# Patient Record
Sex: MALE | Race: WHITE | Employment: FULL TIME | URBAN - METROPOLITAN AREA
[De-identification: names, ages, dates, MRNs, and addresses within clinical notes are randomized per-mention and may not be internally consistent; named-entity substitution may affect disease eponyms.]

---

## 2020-07-29 ENCOUNTER — OFFICE VISIT (OUTPATIENT)
Dept: FAMILY MEDICINE CLINIC | Age: 50
End: 2020-07-29
Payer: COMMERCIAL

## 2020-07-29 VITALS
TEMPERATURE: 99.2 F | DIASTOLIC BLOOD PRESSURE: 78 MMHG | HEART RATE: 83 BPM | SYSTOLIC BLOOD PRESSURE: 124 MMHG | OXYGEN SATURATION: 95 % | BODY MASS INDEX: 29.41 KG/M2 | WEIGHT: 187.4 LBS | HEIGHT: 67 IN

## 2020-07-29 PROCEDURE — 99386 PREV VISIT NEW AGE 40-64: CPT | Performed by: NURSE PRACTITIONER

## 2020-07-29 RX ORDER — HYDROCHLOROTHIAZIDE 12.5 MG/1
12.5 CAPSULE, GELATIN COATED ORAL DAILY
COMMUNITY
End: 2020-07-29 | Stop reason: SDUPTHER

## 2020-07-29 RX ORDER — LISINOPRIL 40 MG/1
40 TABLET ORAL DAILY
Qty: 360 TABLET | Refills: 0 | Status: SHIPPED | OUTPATIENT
Start: 2020-07-29 | End: 2021-08-11 | Stop reason: SDUPTHER

## 2020-07-29 RX ORDER — HYDROCHLOROTHIAZIDE 12.5 MG/1
12.5 CAPSULE, GELATIN COATED ORAL DAILY
Qty: 360 CAPSULE | Refills: 0 | Status: SHIPPED | OUTPATIENT
Start: 2020-07-29 | End: 2021-08-11 | Stop reason: SDUPTHER

## 2020-07-29 RX ORDER — LISINOPRIL 40 MG/1
40 TABLET ORAL DAILY
COMMUNITY
End: 2020-07-29 | Stop reason: SDUPTHER

## 2020-07-29 ASSESSMENT — PATIENT HEALTH QUESTIONNAIRE - PHQ9
SUM OF ALL RESPONSES TO PHQ QUESTIONS 1-9: 0
SUM OF ALL RESPONSES TO PHQ9 QUESTIONS 1 & 2: 0
1. LITTLE INTEREST OR PLEASURE IN DOING THINGS: 0
2. FEELING DOWN, DEPRESSED OR HOPELESS: 0
SUM OF ALL RESPONSES TO PHQ QUESTIONS 1-9: 0

## 2020-07-29 NOTE — PROGRESS NOTES
 Smokeless tobacco: Never Used   Substance and Sexual Activity    Alcohol use: Not on file    Drug use: Not on file    Sexual activity: Not on file   Lifestyle    Physical activity     Days per week: Not on file     Minutes per session: Not on file    Stress: Not on file   Relationships    Social connections     Talks on phone: Not on file     Gets together: Not on file     Attends Congregation service: Not on file     Active member of club or organization: Not on file     Attends meetings of clubs or organizations: Not on file     Relationship status: Not on file    Intimate partner violence     Fear of current or ex partner: Not on file     Emotionally abused: Not on file     Physically abused: Not on file     Forced sexual activity: Not on file   Other Topics Concern    Not on file   Social History Narrative    Not on file       Self-testicular exams: Yes  Sexual activity: has sex with females   Last eye exam: 2019, normal  Exercise: no regular exercise, history of heavy lifting    Review of Systems:  A comprehensive review of systems was negative. Physical Exam:   Vitals:    07/29/20 1119   BP: 124/78   Site: Right Upper Arm   Position: Sitting   Cuff Size: Medium Adult   Pulse: 83   Temp: 99.2 °F (37.3 °C)   SpO2: 95%   Weight: 187 lb 6.4 oz (85 kg)   Height: 5' 7\" (1.702 m)     Body mass index is 29.35 kg/m². Constitutional: He is oriented to person, place, and time. He appears well-developed and well-nourished. No distress. HEENT:   Head: Normocephalic and atraumatic. Right Ear: Tympanic membrane, external ear and ear canal normal.   Left Ear: Tympanic membrane, external ear and ear canal normal.   Mouth/Throat: Oropharynx is clear and moist and mucous membranes are normal. No oropharyngeal exudate or posterior oropharyngeal erythema. He has no cervical adenopathy. Eyes: Conjunctivae and extraocular motions are normal. Pupils are equal, round, and reactive to light. Neck:  Supple.  No JVD present. Carotid bruit is not present. No mass and no thyromegaly present. Cardiovascular: Normal rate, regular rhythm, normal heart sounds and intact distal pulses. Exam reveals no gallop and no friction rub. No murmur heard. Pulmonary/Chest: Effort normal and breath sounds normal. No respiratory distress. He has no wheezes, rhonchi or rales. Abdominal: Soft, non-tender. Bowel sounds and aorta are normal. There is no organomegaly, mass or bruit. Genitourinary:  Not examined. Musculoskeletal: Normal range of motion, no synovitis. He exhibits no edema. Neurological: He is alert and oriented to person, place, and time. He has normal reflexes. No cranial nerve deficit. Coordination normal.   Skin: Skin is warm, dry and intact. No suspicious lesions are noted. Psychiatric: He has a normal mood and affect.  His speech is normal and behavior is normal. Judgment, cognition and memory are normal.     Preventive Care:  Health Maintenance   Topic Date Due    Potassium monitoring  1970    Creatinine monitoring  1970    HIV screen  12/22/1984    DTaP/Tdap/Td vaccine (1 - Tdap) 12/22/1988    Lipid screen  12/22/2009    Diabetes screen  12/22/2009    Shingles Vaccine (1 of 2) 12/22/2019    Colon cancer screen colonoscopy  12/22/2019    Flu vaccine (1) 09/01/2020    Hepatitis A vaccine  Aged Out    Hepatitis B vaccine  Aged Out    Hib vaccine  Aged Out    Meningococcal (ACWY) vaccine  Aged Out    Pneumococcal 0-64 years Vaccine  Aged [employer] plan of care for Bethanne Closs        7/29/2020           Preventive Measures Status       Recommendations for screening   Prostate Cancer Screen  No results found for: PSA   Test recommended and ordered    Colon Cancer Screen  Last colonoscopy: NA Discussed/advised   Diabetes Screen  No results found for: GLUCOSE Test recommended and ordered   Cholesterol Screen  No results found for: CHOL, TRIG, HDL, LDLCALC, LDLDIRECT Test kg)   SpO2 95%   BMI 29.35 kg/m²   Weight: 187 lb 6.4 oz (85 kg)     BP Readings from Last 3 Encounters:   07/29/20 124/78     Wt Readings from Last 3 Encounters:   07/29/20 187 lb 6.4 oz (85 kg)     BMI Readings from Last 3 Encounters:   07/29/20 29.35 kg/m²       Physical Exam    Assessment/Plan    1. Encounter for preventive health examination  Advised influenza vaccination in fall  Suggested TDAP  Discussed colonoscopy  - CBC Auto Differential; Future  - Comprehensive Metabolic Panel, Fasting; Future  - Lipid, Fasting; Future    2. Hypotestosteronemia in male  Testosterone level  - Testosterone Cypionate 200 MG/ML KIT; Inject 1 mL into the muscle every 14 days for 2 doses. Dispense: 2 kit; Refill: 0    3. Screening for prostate cancer  - Psa screening; Future    4. Essential hypertension  Advised routine monitoring  - TSH with Reflex; Future  - lisinopril (PRINIVIL;ZESTRIL) 40 MG tablet; Take 1 tablet by mouth daily  Dispense: 360 tablet; Refill: 0  - hydroCHLOROthiazide (MICROZIDE) 12.5 MG capsule; Take 1 capsule by mouth daily  Dispense: 360 capsule; Refill: 0      Return in about 1 year (around 7/29/2021) for hypertension re-check, annual check up. This dictation was generated by voice recognition computer software. Although all attempts are made to edit the dictation for accuracy, there may be errors in the transcription that are not intended.

## 2020-07-30 ENCOUNTER — TELEPHONE (OUTPATIENT)
Dept: FAMILY MEDICINE CLINIC | Age: 50
End: 2020-07-30

## 2020-07-30 NOTE — TELEPHONE ENCOUNTER
Called and spoke with pharmacy. They stated that they don't have the kit available where they vial and syringe come together but are able to split the two up. Spoke with Chuy Doran okay to give verbal order to pharmacy. Patient will need 22g 1 1/2 inch needle with either 1ml/3ml syringe. Pharmacy aware and will get it ready for patient.

## 2021-01-04 DIAGNOSIS — E29.1 HYPOTESTOSTERONEMIA IN MALE: Primary | ICD-10-CM

## 2021-01-04 NOTE — TELEPHONE ENCOUNTER
Medication:   Requested Prescriptions     Pending Prescriptions Disp Refills    testosterone cypionate (DEPOTESTOTERONE CYPIONATE) 200 MG/ML injection [Pharmacy Med Name: testosterone cypionate 200 mg/mL intramuscular oil] 1 mL 0     Sig: INJECT ONE ML EVERY 14 DAYS        Patient Phone Number: [phone] (home)     Last appt: 7/29/2020   Next appt: Visit date not found    Last OARRS: No flowsheet data found. PDMP Monitoring:    Last PDMP Javi as Reviewed [facility]):  Review User Review Instant Review Result          Preferred Pharmacy:   [facility], [address] [place] [address] [phone]  [address] [address]  Phone: [phone] Fax: [phone]

## 2021-01-05 RX ORDER — TESTOSTERONE CYPIONATE 200 MG/ML
INJECTION INTRAMUSCULAR
Qty: 1 ML | Refills: 0 | Status: SHIPPED | OUTPATIENT
Start: 2021-01-05 | End: 2021-08-11

## 2021-05-05 ENCOUNTER — TELEPHONE (OUTPATIENT)
Dept: FAMILY MEDICINE CLINIC | Age: 51
End: 2021-05-05

## 2021-05-10 ENCOUNTER — OFFICE VISIT (OUTPATIENT)
Dept: FAMILY MEDICINE CLINIC | Age: 51
End: 2021-05-10
Payer: COMMERCIAL

## 2021-05-10 VITALS
DIASTOLIC BLOOD PRESSURE: 78 MMHG | SYSTOLIC BLOOD PRESSURE: 134 MMHG | HEIGHT: 67 IN | BODY MASS INDEX: 28.88 KG/M2 | WEIGHT: 184 LBS | TEMPERATURE: 98.1 F | HEART RATE: 91 BPM

## 2021-05-10 DIAGNOSIS — I10 ESSENTIAL HYPERTENSION: ICD-10-CM

## 2021-05-10 DIAGNOSIS — Z00.00 ENCOUNTER FOR PREVENTIVE HEALTH EXAMINATION: Primary | ICD-10-CM

## 2021-05-10 DIAGNOSIS — R73.9 HYPERGLYCEMIA: ICD-10-CM

## 2021-05-10 DIAGNOSIS — Z72.51 UNPROTECTED SEXUAL INTERCOURSE: Primary | ICD-10-CM

## 2021-05-10 DIAGNOSIS — Z12.5 SCREENING FOR PROSTATE CANCER: ICD-10-CM

## 2021-05-10 LAB
BILIRUBIN, POC: NEGATIVE
BLOOD URINE, POC: ABNORMAL
CLARITY, POC: CLEAR
COLOR, POC: YELLOW
GLUCOSE URINE, POC: NEGATIVE
KETONES, POC: NEGATIVE
LEUKOCYTE EST, POC: NEGATIVE
NITRITE, POC: NEGATIVE
PH, POC: 6
PROTEIN, POC: NEGATIVE
SPECIFIC GRAVITY, POC: 1.01
UROBILINOGEN, POC: ABNORMAL

## 2021-05-10 PROCEDURE — 81002 URINALYSIS NONAUTO W/O SCOPE: CPT | Performed by: NURSE PRACTITIONER

## 2021-05-10 PROCEDURE — 99213 OFFICE O/P EST LOW 20 MIN: CPT | Performed by: NURSE PRACTITIONER

## 2021-05-10 RX ORDER — VALACYCLOVIR HYDROCHLORIDE 500 MG/1
500 TABLET, FILM COATED ORAL DAILY
COMMUNITY
End: 2021-05-16 | Stop reason: SDUPTHER

## 2021-05-10 RX ORDER — TESTOSTERONE CYPIONATE 200 MG/ML
200 INJECTION INTRAMUSCULAR SEE ADMIN INSTRUCTIONS
COMMUNITY
End: 2021-08-11 | Stop reason: SDUPTHER

## 2021-05-10 ASSESSMENT — ENCOUNTER SYMPTOMS: BACK PAIN: 0

## 2021-05-10 NOTE — PROGRESS NOTES
Fiorella Jimenez  : 1970  Encounter date: 5/10/2021    This janeen 45 y.o. male who presents with  Chief Complaint   Patient presents with    Other     STD screen       History of present illness:    HPI Pt is 45year old male for STD check, reports recent unprotected sex in past.  Reports history of HSV and currently taking valtrex 500 mg daily. Last outbreak 1.5 years ago. Pt with girlfriend, history HPV, currently having genital warts removed. Pt requesting full STD panel. Pt denies current active symptoms. Reports one episode of unprotected sex, neither partner with active lesion. Current Outpatient Medications on File Prior to Visit   Medication Sig Dispense Refill    valACYclovir (VALTREX) 500 MG tablet Take 500 mg by mouth daily      testosterone cypionate (DEPOTESTOTERONE CYPIONATE) 200 MG/ML injection Inject 200 mg into the muscle See Admin Instructions. Inject 1 ml every 14 days      lisinopril (PRINIVIL;ZESTRIL) 40 MG tablet Take 1 tablet by mouth daily 360 tablet 0    hydroCHLOROthiazide (MICROZIDE) 12.5 MG capsule Take 1 capsule by mouth daily 360 capsule 0    testosterone cypionate (DEPOTESTOTERONE CYPIONATE) 200 MG/ML injection INJECT ONE ML EVERY 14 DAYS 1 mL 0     No current facility-administered medications on file prior to visit. No Known Allergies  History reviewed. No pertinent past medical history. History reviewed. No pertinent surgical history. History reviewed. No pertinent family history. Social History     Tobacco Use    Smoking status: Never Smoker    Smokeless tobacco: Never Used   Substance Use Topics    Alcohol use: Not on file        Review of Systems   Constitutional: Negative for activity change, chills, fatigue and fever. Genitourinary: Negative for decreased urine volume, difficulty urinating, discharge, dysuria, frequency, genital sores, hematuria, penile pain, penile swelling, scrotal swelling, testicular pain and urgency.    Musculoskeletal: Negative for back pain. Skin: Negative for rash. Allergic/Immunologic: Negative for immunocompromised state. Objective:    /78   Pulse 91   Temp 98.1 °F (36.7 °C)   Ht 5' 6.75\" (1.695 m) Comment: in shoes  Wt 184 lb (83.5 kg)   BMI 29.03 kg/m²   Weight: 184 lb (83.5 kg)     BP Readings from Last 3 Encounters:   05/10/21 134/78   07/29/20 124/78     Wt Readings from Last 3 Encounters:   05/10/21 184 lb (83.5 kg)   07/29/20 187 lb 6.4 oz (85 kg)     BMI Readings from Last 3 Encounters:   05/10/21 29.03 kg/m²   07/29/20 29.35 kg/m²       Physical Exam  Vitals signs reviewed. Constitutional:       Appearance: Normal appearance. He is well-developed. Cardiovascular:      Rate and Rhythm: Normal rate and regular rhythm. Heart sounds: Normal heart sounds. No murmur. Pulmonary:      Effort: Pulmonary effort is normal.      Breath sounds: Normal breath sounds. Genitourinary:     Penis: Normal.       Testes: Normal.   Skin:     General: Skin is warm and dry. Findings: No rash. Neurological:      Mental Status: He is alert and oriented to person, place, and time. Assessment/Plan    1. Unprotected sexual intercourse  Advised condoms  Discussed modes of transmission  Continue valtrex 500 mg daily prophylactic  - HIV Screen; Future  - HEPATITIS C ANTIBODY; Future  - C.trachomatis N.gonorrhoeae DNA, Urine; Future  - Herpes simplex virus (HSV) I/II antibodies IgG & IgM w/ reflex; Future  - RPR Reflex; Future  - POCT Urinalysis no Micro  - Culture, Urine      Return if symptoms worsen or fail to improve, for unresolved symptoms. This dictation was generated by voice recognition computer software. Although all attempts are made to edit the dictation for accuracy, there may be errors in the transcription that are not intended.

## 2021-05-12 LAB — URINE CULTURE, ROUTINE: NORMAL

## 2021-05-13 ENCOUNTER — TELEPHONE (OUTPATIENT)
Dept: FAMILY MEDICINE CLINIC | Age: 51
End: 2021-05-13

## 2021-05-13 NOTE — TELEPHONE ENCOUNTER
I called and informed the pt of his lab results. Pt voiced understanding. Pt also asked if he can get an Rx for the herpes? Pt dose not have a breakout at this time. He stats that he thought that there is a daily med that he can take for this.

## 2021-05-16 DIAGNOSIS — A60.00 GENITAL HERPES SIMPLEX, UNSPECIFIED SITE: Primary | ICD-10-CM

## 2021-05-16 RX ORDER — VALACYCLOVIR HYDROCHLORIDE 500 MG/1
500 TABLET, FILM COATED ORAL DAILY
Qty: 90 TABLET | Refills: 1 | Status: SHIPPED | OUTPATIENT
Start: 2021-05-16 | End: 2021-08-11 | Stop reason: SDUPTHER

## 2021-08-11 ENCOUNTER — TELEPHONE (OUTPATIENT)
Dept: FAMILY MEDICINE CLINIC | Age: 51
End: 2021-08-11

## 2021-08-11 ENCOUNTER — OFFICE VISIT (OUTPATIENT)
Dept: FAMILY MEDICINE CLINIC | Age: 51
End: 2021-08-11
Payer: COMMERCIAL

## 2021-08-11 VITALS
BODY MASS INDEX: 28.56 KG/M2 | HEART RATE: 90 BPM | DIASTOLIC BLOOD PRESSURE: 88 MMHG | WEIGHT: 182 LBS | SYSTOLIC BLOOD PRESSURE: 128 MMHG | TEMPERATURE: 98.2 F | HEIGHT: 67 IN | OXYGEN SATURATION: 98 %

## 2021-08-11 DIAGNOSIS — Z12.5 SCREENING FOR MALIGNANT NEOPLASM OF PROSTATE: ICD-10-CM

## 2021-08-11 DIAGNOSIS — Z12.11 SCREENING FOR COLON CANCER: ICD-10-CM

## 2021-08-11 DIAGNOSIS — A60.00 GENITAL HERPES SIMPLEX, UNSPECIFIED SITE: ICD-10-CM

## 2021-08-11 DIAGNOSIS — I10 ESSENTIAL HYPERTENSION: ICD-10-CM

## 2021-08-11 DIAGNOSIS — Z00.00 PREVENTATIVE HEALTH CARE: Primary | ICD-10-CM

## 2021-08-11 DIAGNOSIS — E29.1 HYPOTESTOSTERONEMIA IN MALE: ICD-10-CM

## 2021-08-11 PROCEDURE — 99396 PREV VISIT EST AGE 40-64: CPT | Performed by: NURSE PRACTITIONER

## 2021-08-11 RX ORDER — TESTOSTERONE CYPIONATE 200 MG/ML
200 INJECTION INTRAMUSCULAR SEE ADMIN INSTRUCTIONS
Status: CANCELLED | OUTPATIENT
Start: 2021-08-11

## 2021-08-11 RX ORDER — TESTOSTERONE CYPIONATE 200 MG/ML
200 INJECTION INTRAMUSCULAR SEE ADMIN INSTRUCTIONS
Qty: 10 ML | Refills: 1 | Status: SHIPPED | OUTPATIENT
Start: 2021-08-11 | End: 2022-04-26 | Stop reason: SDUPTHER

## 2021-08-11 RX ORDER — LISINOPRIL 40 MG/1
40 TABLET ORAL DAILY
Qty: 10 TABLET | Refills: 0 | Status: SHIPPED | OUTPATIENT
Start: 2021-08-11 | End: 2022-08-22 | Stop reason: SDUPTHER

## 2021-08-11 RX ORDER — HYDROCHLOROTHIAZIDE 12.5 MG/1
12.5 CAPSULE, GELATIN COATED ORAL DAILY
Qty: 360 CAPSULE | Refills: 0 | Status: SHIPPED | OUTPATIENT
Start: 2021-08-11 | End: 2022-08-22 | Stop reason: SDUPTHER

## 2021-08-11 RX ORDER — LISINOPRIL 40 MG/1
40 TABLET ORAL DAILY
Qty: 360 TABLET | Refills: 0 | Status: SHIPPED | OUTPATIENT
Start: 2021-08-11 | End: 2021-08-11 | Stop reason: SDUPTHER

## 2021-08-11 RX ORDER — VALACYCLOVIR HYDROCHLORIDE 500 MG/1
500 TABLET, FILM COATED ORAL DAILY
Qty: 90 TABLET | Refills: 1 | Status: SHIPPED | OUTPATIENT
Start: 2021-08-11 | End: 2022-04-28

## 2021-08-11 SDOH — ECONOMIC STABILITY: FOOD INSECURITY: WITHIN THE PAST 12 MONTHS, THE FOOD YOU BOUGHT JUST DIDN'T LAST AND YOU DIDN'T HAVE MONEY TO GET MORE.: NEVER TRUE

## 2021-08-11 SDOH — ECONOMIC STABILITY: FOOD INSECURITY: WITHIN THE PAST 12 MONTHS, YOU WORRIED THAT YOUR FOOD WOULD RUN OUT BEFORE YOU GOT MONEY TO BUY MORE.: NEVER TRUE

## 2021-08-11 ASSESSMENT — PATIENT HEALTH QUESTIONNAIRE - PHQ9
2. FEELING DOWN, DEPRESSED OR HOPELESS: 0
SUM OF ALL RESPONSES TO PHQ QUESTIONS 1-9: 0
SUM OF ALL RESPONSES TO PHQ9 QUESTIONS 1 & 2: 0
SUM OF ALL RESPONSES TO PHQ QUESTIONS 1-9: 0
1. LITTLE INTEREST OR PLEASURE IN DOING THINGS: 0
SUM OF ALL RESPONSES TO PHQ QUESTIONS 1-9: 0

## 2021-08-11 ASSESSMENT — SOCIAL DETERMINANTS OF HEALTH (SDOH): HOW HARD IS IT FOR YOU TO PAY FOR THE VERY BASICS LIKE FOOD, HOUSING, MEDICAL CARE, AND HEATING?: NOT HARD AT ALL

## 2021-08-11 NOTE — TELEPHONE ENCOUNTER
I called pt Pharmacy ([facility]) and spoke with the pharmacist to give the verbal on pt 22g 1 1/2 inch needle with 3cc syringe quantity of 9.

## 2021-08-11 NOTE — PROGRESS NOTES
Matilda Cosby  : 1970  Encounter date: 2021    This janeen 45 y.o. male who presents with  Chief Complaint   Patient presents with    Annual Exam     Physical        History of present illness:    HPI   History and Physical      Matilda Cosby  YOB: 1970    Date of Service:  2021    Chief Complaint:   Matilda Cosby is a 45 y.o. male who  presents for physical examination. HPI: Pt is 45year old male for annual exam.  Due for labs. Existing hypertension, low testosterone and genital herpes. Reports BP well controlled. Wt Readings from Last 3 Encounters:   21 182 lb (82.6 kg)   05/10/21 184 lb (83.5 kg)   20 187 lb 6.4 oz (85 kg)     BP Readings from Last 3 Encounters:   21 128/88   05/10/21 134/78   20 124/78       There is no problem list on file for this patient. No Known Allergies  Outpatient Medications Marked as Taking for the 21 encounter (Office Visit) with SUDHIR Zazueta NP   Medication Sig Dispense Refill    hydroCHLOROthiazide (MICROZIDE) 12.5 MG capsule Take 1 capsule by mouth daily 360 capsule 0    valACYclovir (VALTREX) 500 MG tablet Take 1 tablet by mouth daily 90 tablet 1    lisinopril (PRINIVIL;ZESTRIL) 40 MG tablet Take 1 tablet by mouth daily for 10 days 10 tablet 0    testosterone cypionate (DEPOTESTOTERONE CYPIONATE) 200 MG/ML injection Inject 1 mL into the muscle See Admin Instructions for 92 days. Inject 1 ml every 14 days 10 mL 1       History reviewed. No pertinent past medical history. History reviewed. No pertinent surgical history. History reviewed. No pertinent family history.   Social History     Socioeconomic History    Marital status: Single     Spouse name: Not on file    Number of children: Not on file    Years of education: Not on file    Highest education level: Not on file   Occupational History    Not on file   Tobacco Use    Smoking status: Never Smoker    Smokeless tobacco: Never Used   Substance and Sexual Activity    Alcohol use: Not on file    Drug use: Not on file    Sexual activity: Not on file   Other Topics Concern    Not on file   Social History Narrative    Not on file     Social Determinants of Health     Financial Resource Strain: Low Risk     Difficulty of Paying Living Expenses: Not hard at all   Food Insecurity: No Food Insecurity    Worried About Running Out of Food in the Last Year: Never true    [address] in the Last Year: Never true   Transportation Needs:     Lack of Transportation (Medical):  Lack of Transportation (Non-Medical):    Physical Activity:     Days of Exercise per Week:     Minutes of Exercise per Session:    Stress:     Feeling of Stress :    Social Connections:     Frequency of Communication with Friends and Family:     Frequency of Social Gatherings with Friends and Family:     Attends [employer] Services:     Active Member of Clubs or Organizations:     Attends Club or Organization Meetings:     Marital Status:    Intimate Partner Violence:     Fear of Current or Ex-Partner:     Emotionally Abused:     Physically Abused:     Sexually Abused:        Self-testicular exams: Yes  Sexual activity: single partner, contraception - condoms   Last eye exam: 04/2021  Exercise: no regular activity    Review of Systems:  A comprehensive review of systems was negative except for what was noted in the HPI. Physical Exam:   Vitals:    08/11/21 1341 08/11/21 1433   BP: (!) 138/92 128/88   Site: Left Upper Arm    Position: Sitting    Cuff Size: Medium Adult    Pulse: 90    Temp: 98.2 °F (36.8 °C)    TempSrc: Temporal    SpO2: 98%    Weight: 182 lb (82.6 kg)    Height: 5' 6.5\" (1.689 m)      Body mass index is 28.94 kg/m². Constitutional: He is oriented to person, place, and time. He appears well-developed and well-nourished. No distress. HEENT:   Head: Normocephalic and atraumatic.    Right Ear: Tympanic membrane, external ear and ear canal normal.   Left Ear: Tympanic membrane, external ear and ear canal normal.   Mouth/Throat: Oropharynx is clear and moist and mucous membranes are normal. No oropharyngeal exudate or posterior oropharyngeal erythema. He has no cervical adenopathy. Eyes: Conjunctivae and extraocular motions are normal. Pupils are equal, round, and reactive to light. Neck:  Supple. No JVD present. Carotid bruit is not present. No mass and no thyromegaly present. Cardiovascular: Normal rate, regular rhythm, normal heart sounds and intact distal pulses. Exam reveals no gallop and no friction rub. No murmur heard. Pulmonary/Chest: Effort normal and breath sounds normal. No respiratory distress. He has no wheezes, rhonchi or rales. Abdominal: Soft, non-tender. Bowel sounds and aorta are normal. There is no organomegaly, mass or bruit. Genitourinary:  rectal not indicated by age criteria and lack of symptoms. Musculoskeletal: Normal range of motion, no synovitis. He exhibits no edema. Neurological: He is alert and oriented to person, place, and time. He has normal reflexes. No cranial nerve deficit. Coordination normal.   Skin: Skin is warm, dry and intact. No suspicious lesions are noted. Psychiatric: He has a normal mood and affect.  His speech is normal and behavior is normal. Judgment, cognition and memory are normal.     Preventive Care:  Health Maintenance   Topic Date Due    Hepatitis C screen  Never done    HIV screen  Never done    DTaP/Tdap/Td vaccine (1 - Tdap) Never done    Colon cancer screen colonoscopy  Never done    Shingles Vaccine (1 of 2) Never done    Flu vaccine (1) 09/01/2021    Lipid screen  08/11/2025    COVID-19 Vaccine  Completed    Hepatitis A vaccine  Aged Out    Hepatitis B vaccine  Aged Out    Hib vaccine  Aged Out    Meningococcal (ACWY) vaccine  Aged Out    Pneumococcal 0-64 years Vaccine  Aged [employer] plan of care for [employer]        8/11/2021 Preventive Measures Status       Recommendations for screening   Prostate Cancer Screen  Lab Results   Component Value Date    PSA 1.8 08/11/2020      Test recommended and ordered    Colon Cancer Screen  Last colonoscopy: NA Test recommended and ordered   Diabetes Screen  Glucose (mg/dL)   Date Value   08/11/2020 91    Test recommended and ordered   Cholesterol Screen  Lab Results   Component Value Date    CHOL 159 08/11/2020    TRIG 57 08/11/2020    HDL 46 08/11/2020    LDLCALC 102 (H) 08/11/2020    Test recommended and ordered   Aspirin for Cardiovascular Prevention   No Not indicated   Weight: Body mass index is 28.94 kg/m². 5' 6.5\" (1.689 m)182 lb (82.6 kg)  Your BMI is 25 or greater, which indicates that you are overweight   Living Will: No recommended    Recommended Immunizations    Immunization History   Administered Date(s) Administered    COVID-19, Pfizer, PF, 30mcg/0.3mL 03/17/2021, 04/07/2021    Influenza vaccine:  recommended every fall         Other Recommendations ·   Follow up in this office every 6 months for re-evaluation of your chronic medical issues             Assessment/Plan:  Karma Larson was seen today for annual exam.    Diagnoses and all orders for this visit:    Preventative health care  -     CBC Auto Differential; Future  -     Comprehensive Metabolic Panel, Fasting; Future  -     Lipid, Fasting; Future  -     Hepatitis C Antibody; Future  -     HIV Screen; Future    Essential hypertension  -     Lipid, Fasting; Future  -     Microalbumin / Creatinine Urine Ratio; Future  -     hydroCHLOROthiazide (MICROZIDE) 12.5 MG capsule; Take 1 capsule by mouth daily  -     Discontinue: lisinopril (PRINIVIL;ZESTRIL) 40 MG tablet; Take 1 tablet by mouth daily  -     lisinopril (PRINIVIL;ZESTRIL) 40 MG tablet; Take 1 tablet by mouth daily for 10 days    Screening for malignant neoplasm of prostate  -     PSA screening;  Future    Genital herpes simplex, unspecified site  -     valACYclovir (VALTREX) 500 MG tablet; Take 1 tablet by mouth daily    Hypotestosteronemia in male  -     Testosterone; Future  -     testosterone cypionate (DEPOTESTOTERONE CYPIONATE) 200 MG/ML injection; Inject 1 mL into the muscle See Admin Instructions for 92 days. Inject 1 ml every 14 days    Screening for colon cancer  -     [place] - Gualberto Clement MD, Gastroenterology, [facility]            No current outpatient medications on file prior to visit. No current facility-administered medications on file prior to visit. No Known Allergies  History reviewed. No pertinent past medical history. History reviewed. No pertinent surgical history. History reviewed. No pertinent family history. Social History     Tobacco Use    Smoking status: Never Smoker    Smokeless tobacco: Never Used   Substance Use Topics    Alcohol use: Not on file        Review of Systems    Objective:    /88   Pulse 90   Temp 98.2 °F (36.8 °C) (Temporal)   Ht 5' 6.5\" (1.689 m)   Wt 182 lb (82.6 kg)   SpO2 98%   BMI 28.94 kg/m²   Weight: 182 lb (82.6 kg)     BP Readings from Last 3 Encounters:   08/11/21 128/88   05/10/21 134/78   07/29/20 124/78     Wt Readings from Last 3 Encounters:   08/11/21 182 lb (82.6 kg)   05/10/21 184 lb (83.5 kg)   07/29/20 187 lb 6.4 oz (85 kg)     BMI Readings from Last 3 Encounters:   08/11/21 28.94 kg/m²   05/10/21 29.03 kg/m²   07/29/20 29.35 kg/m²       Physical Exam    Assessment/Plan    1. Preventative health care  Advised increased exercise  Routine dental and vision  Advised living will  - CBC Auto Differential; Future  - Comprehensive Metabolic Panel, Fasting; Future  - Lipid, Fasting; Future  - Hepatitis C Antibody; Future  - HIV Screen; Future    2. Essential hypertension  Continue lisinopril 40 mg   Continue microzide 12.5 mg  - Lipid, Fasting; Future  - Microalbumin / Creatinine Urine Ratio; Future    3. Screening for malignant neoplasm of prostate  - PSA screening;  Future      Return in about 6 months (around 2/11/2022) for hypertension re-check. This dictation was generated by voice recognition computer software. Although all attempts are made to edit the dictation for accuracy, there may be errors in the transcription that are not intended.

## 2021-08-21 DIAGNOSIS — I10 ESSENTIAL HYPERTENSION: ICD-10-CM

## 2021-08-24 RX ORDER — LISINOPRIL 40 MG/1
TABLET ORAL
Qty: 10 TABLET | OUTPATIENT
Start: 2021-08-24

## 2021-08-24 NOTE — TELEPHONE ENCOUNTER
Medication:   Requested Prescriptions     Pending Prescriptions Disp Refills    lisinopril (PRINIVIL;ZESTRIL) 40 MG tablet [Pharmacy Med Name: LISINOPRIL 40 MG TABLET] 10 tablet 0     Sig: TAKE ONE TABLET BY MOUTH DAILY FOR 10 DAYS      Last Filled:     Patient Phone Number: [phone] (home)     Last appt: 8/11/2021   Next appt: Visit date not found    Last OARRS: No flowsheet data found. PDMP Monitoring:    Last PDMP Darlyn Flores as Reviewed [facility]):  Review User Review Instant Review Result   Jose Hancock 8/11/2021  2:17 PM Reviewed PDMP [1]     Preferred Pharmacy:   [address] [place] [address] [phone]  [address] [place] [address]  Phone: [phone] Fax: [phone]    [facility] [address], [address] [facility] [address]  [address] [facility]  [address] [place] [address]  Phone: [phone] Fax: [phone]

## 2021-08-26 NOTE — TELEPHONE ENCOUNTER
Please notify patient that blood study showed no signs of anemia, normal kidney and liver function, good cholesterol, negative for gonorrhea, chlamydia and trich. Tested positive for herpes 1 and 2. No signs of diabetes, normal PSA level.   Negative for syphilis, HIV and hepatitis C. Xolair Pregnancy And Lactation Text: This medication is Pregnancy Category B and is considered safe during pregnancy. This medication is excreted in breast milk.

## 2021-10-05 DIAGNOSIS — E78.00 PURE HYPERCHOLESTEROLEMIA: Primary | ICD-10-CM

## 2021-12-06 ENCOUNTER — OFFICE VISIT (OUTPATIENT)
Dept: FAMILY MEDICINE CLINIC | Age: 51
End: 2021-12-06
Payer: COMMERCIAL

## 2021-12-06 VITALS — TEMPERATURE: 96.7 F | OXYGEN SATURATION: 96 % | HEART RATE: 81 BPM

## 2021-12-06 DIAGNOSIS — J20.9 ACUTE BRONCHITIS, UNSPECIFIED ORGANISM: Primary | ICD-10-CM

## 2021-12-06 PROCEDURE — 99213 OFFICE O/P EST LOW 20 MIN: CPT | Performed by: FAMILY MEDICINE

## 2021-12-06 RX ORDER — CEFDINIR 300 MG/1
300 CAPSULE ORAL 2 TIMES DAILY
Qty: 14 CAPSULE | Refills: 0 | Status: SHIPPED | OUTPATIENT
Start: 2021-12-06 | End: 2021-12-13

## 2021-12-06 ASSESSMENT — PATIENT HEALTH QUESTIONNAIRE - PHQ9
SUM OF ALL RESPONSES TO PHQ QUESTIONS 1-9: 0
SUM OF ALL RESPONSES TO PHQ QUESTIONS 1-9: 0
2. FEELING DOWN, DEPRESSED OR HOPELESS: 0
SUM OF ALL RESPONSES TO PHQ9 QUESTIONS 1 & 2: 0
SUM OF ALL RESPONSES TO PHQ QUESTIONS 1-9: 0
1. LITTLE INTEREST OR PLEASURE IN DOING THINGS: 0

## 2021-12-14 ENCOUNTER — TELEPHONE (OUTPATIENT)
Dept: FAMILY MEDICINE CLINIC | Age: 51
End: 2021-12-14

## 2021-12-14 DIAGNOSIS — J20.9 ACUTE BRONCHITIS, UNSPECIFIED ORGANISM: Primary | ICD-10-CM

## 2021-12-14 RX ORDER — GUAIFENESIN AND CODEINE PHOSPHATE 100; 10 MG/5ML; MG/5ML
10 SOLUTION ORAL 4 TIMES DAILY PRN
Qty: 200 ML | Refills: 0 | Status: SHIPPED | OUTPATIENT
Start: 2021-12-14 | End: 2021-12-21

## 2021-12-14 NOTE — TELEPHONE ENCOUNTER
----- Message from [facility] sent at 12/14/2021  4:57 PM EST -----  Subject: Message to Provider    QUESTIONS  Information for Provider? Urgent!! pt needs prescription filled for severe   cough.   ---------------------------------------------------------------------------  --------------  CALL BACK INFO  What is the best way for the office to contact you? OK to leave message on   voicemail  Preferred Call Back Phone Number? [phone]  ---------------------------------------------------------------------------  --------------  SCRIPT ANSWERS  Relationship to Patient?  Self

## 2021-12-14 NOTE — TELEPHONE ENCOUNTER
----- Message from Josh Lopez sent at 12/14/2021  2:53 PM EST -----  Subject: Message to Provider    QUESTIONS  Information for Provider? Pt was seen last week for bronchitis and is   doing better but still has a cough, pt is requesting something is called   in for this. Uses [employer].  ---------------------------------------------------------------------------  --------------  Geovanna TREJO  What is the best way for the office to contact you? OK to leave message on   voicemail  Preferred Call Back Phone Number? [phone]  ---------------------------------------------------------------------------  --------------  SCRIPT ANSWERS  Relationship to Patient?  Self

## 2021-12-15 ENCOUNTER — TELEPHONE (OUTPATIENT)
Dept: FAMILY MEDICINE CLINIC | Age: 51
End: 2021-12-15

## 2021-12-15 DIAGNOSIS — J20.9 ACUTE BRONCHITIS, UNSPECIFIED ORGANISM: Primary | ICD-10-CM

## 2021-12-15 RX ORDER — MONTELUKAST SODIUM 10 MG/1
10 TABLET ORAL DAILY
Qty: 30 TABLET | Refills: 3 | Status: SHIPPED | OUTPATIENT
Start: 2021-12-15 | End: 2022-08-31

## 2021-12-15 NOTE — TELEPHONE ENCOUNTER
----- Message from Quinonesgalen Naylor sent at 12/14/2021  6:22 PM EST -----  Subject: Message to Provider    QUESTIONS  Information for Provider? Patient states that the Cough medicine that was   prescribed to him states that this will make him drowsy, and cant take   this while he is at work. Can you prescribe another medication that wont   make him dowsy  ---------------------------------------------------------------------------  --------------  [address]  What is the best way for the office to contact you? OK to leave message on   voicemail  Preferred Call Back Phone Number? [phone]  ---------------------------------------------------------------------------  --------------  SCRIPT ANSWERS  Relationship to Patient?  Self

## 2021-12-15 NOTE — TELEPHONE ENCOUNTER
Pt states he's been taking mucinex for about 7-10 days and it's not significant. States cough is still not going away. Please advise.

## 2021-12-15 NOTE — TELEPHONE ENCOUNTER
----- Message from [facility] sent at 12/14/2021  4:57 PM EST -----  Subject: Message to Provider    QUESTIONS  Information for Provider? Urgent!! pt needs prescription filled for severe   cough.   ---------------------------------------------------------------------------  --------------  CALL BACK INFO  What is the best way for the office to contact you? OK to leave message on   voicemail  Preferred Call Back Phone Number? [phone]  ---------------------------------------------------------------------------  --------------  SCRIPT ANSWERS  Relationship to Patient?  Self

## 2021-12-15 NOTE — TELEPHONE ENCOUNTER
The prescription cough medications, tessalon perles, tussin can have that side effect. Advise OTC delsym or mucinex.

## 2022-04-12 LAB
A/G RATIO: 2 (ref 1.2–2.2)
ALBUMIN SERPL-MCNC: 4.3 G/DL (ref 3.8–4.9)
ALP BLD-CCNC: 65 IU/L (ref 44–121)
ALT SERPL-CCNC: 72 IU/L (ref 0–44)
AMBIGUOUS ABBREVIATION: NORMAL
AMBIGUOUS ABBREVIATION: NORMAL
AST SERPL-CCNC: 33 IU/L (ref 0–40)
BILIRUB SERPL-MCNC: 0.4 MG/DL (ref 0–1.2)
BUN / CREAT RATIO: 20 (ref 9–20)
BUN BLDV-MCNC: 26 MG/DL (ref 6–24)
CALCIUM SERPL-MCNC: 9.9 MG/DL (ref 8.7–10.2)
CHLORIDE BLD-SCNC: 102 MMOL/L (ref 96–106)
CHOLESTEROL, TOTAL: 196 MG/DL (ref 100–199)
CO2: 26 MMOL/L (ref 20–29)
COMMENT: ABNORMAL
CREAT SERPL-MCNC: 1.29 MG/DL (ref 0.76–1.27)
ESTIMATED GLOMERULAR FILTRATION RATE CREATININE EQUATION: 67 ML/MIN/1.73
GLOBULIN: 2.2 G/DL (ref 1.5–4.5)
GLUCOSE BLD-MCNC: 102 MG/DL (ref 65–99)
HDLC SERPL-MCNC: 55 MG/DL
LDL CHOLESTEROL CALCULATED: 126 MG/DL (ref 0–99)
POTASSIUM SERPL-SCNC: 4.3 MMOL/L (ref 3.5–5.2)
SODIUM BLD-SCNC: 141 MMOL/L (ref 134–144)
TOTAL PROTEIN: 6.5 G/DL (ref 6–8.5)
TRIGL SERPL-MCNC: 82 MG/DL (ref 0–149)
VLDLC SERPL CALC-MCNC: 15 MG/DL (ref 5–40)

## 2022-04-14 DIAGNOSIS — E29.1 HYPOTESTOSTERONEMIA IN MALE: Primary | ICD-10-CM

## 2022-04-21 DIAGNOSIS — E29.1 HYPOTESTOSTERONEMIA IN MALE: ICD-10-CM

## 2022-04-25 ENCOUNTER — TELEPHONE (OUTPATIENT)
Dept: FAMILY MEDICINE CLINIC | Age: 52
End: 2022-04-25

## 2022-04-25 NOTE — TELEPHONE ENCOUNTER
----- Message from [address] [facility] sent at 4/22/2022  4:56 PM EDT -----  Subject: Refill Request    QUESTIONS  Name of Medication? testosterone cypionate (DEPOTESTOTERONE CYPIONATE) 200   MG/ML injection  Patient-reported dosage and instructions? inject q 2 weeks  How many days do you have left? 0  Preferred Pharmacy? [facility] #[id number]  Pharmacy phone number (if available)? [phone]  Additional Information for Provider? pt unsure if it's 1 or 1.5mg His just   has his level checked 04/21/2022, pt would like you to look at results to   see if any changes are needs  ---------------------------------------------------------------------------  --------------  CALL BACK INFO  What is the best way for the office to contact you? OK to leave message on   voicemail  Preferred Call Back Phone Number? [phone]  ---------------------------------------------------------------------------  --------------  SCRIPT ANSWERS  Relationship to Patient?  Self

## 2022-04-26 DIAGNOSIS — E29.1 HYPOTESTOSTERONEMIA IN MALE: ICD-10-CM

## 2022-04-26 LAB — TESTOSTERONE TOTAL: 271 NG/DL (ref 220–1000)

## 2022-04-26 RX ORDER — TESTOSTERONE CYPIONATE 200 MG/ML
200 INJECTION INTRAMUSCULAR SEE ADMIN INSTRUCTIONS
Qty: 10 ML | Refills: 1 | Status: SHIPPED | OUTPATIENT
Start: 2022-04-26 | End: 2022-08-31 | Stop reason: SDUPTHER

## 2022-04-26 NOTE — TELEPHONE ENCOUNTER
testosterone cypionate (DEPOTESTOTERONE CYPIONATE) 200 MG/ML injection 10 mL 1 8/11/2021 11/11/2021    Sig - Route: Inject 1 mL into the muscle See Admin Instructions for 92 days.  Inject 1 ml every 14 days - IntraMUSCular      [facility] #[id number] [address]

## 2022-04-28 DIAGNOSIS — A60.00 GENITAL HERPES SIMPLEX, UNSPECIFIED SITE: ICD-10-CM

## 2022-04-28 RX ORDER — VALACYCLOVIR HYDROCHLORIDE 500 MG/1
TABLET, FILM COATED ORAL
Qty: 90 TABLET | Refills: 1 | Status: SHIPPED | OUTPATIENT
Start: 2022-04-28 | End: 2022-08-22 | Stop reason: SDUPTHER

## 2022-08-22 ENCOUNTER — TELEPHONE (OUTPATIENT)
Dept: FAMILY MEDICINE CLINIC | Age: 52
End: 2022-08-22

## 2022-08-22 DIAGNOSIS — I10 ESSENTIAL HYPERTENSION: ICD-10-CM

## 2022-08-22 DIAGNOSIS — A60.00 GENITAL HERPES SIMPLEX, UNSPECIFIED SITE: ICD-10-CM

## 2022-08-22 RX ORDER — VALACYCLOVIR HYDROCHLORIDE 500 MG/1
TABLET, FILM COATED ORAL
Qty: 90 TABLET | Refills: 1 | Status: SHIPPED | OUTPATIENT
Start: 2022-08-22 | End: 2022-08-31 | Stop reason: DRUGHIGH

## 2022-08-22 RX ORDER — HYDROCHLOROTHIAZIDE 12.5 MG/1
12.5 CAPSULE, GELATIN COATED ORAL DAILY
Qty: 360 CAPSULE | Refills: 0 | Status: SHIPPED | OUTPATIENT
Start: 2022-08-22 | End: 2022-08-31 | Stop reason: SDUPTHER

## 2022-08-22 RX ORDER — LISINOPRIL 40 MG/1
40 TABLET ORAL DAILY
Qty: 10 TABLET | Refills: 0 | Status: SHIPPED | OUTPATIENT
Start: 2022-08-22 | End: 2022-08-23 | Stop reason: SDUPTHER

## 2022-08-22 NOTE — TELEPHONE ENCOUNTER
valACYclovir (VALTREX) 500 MG tablet 90 tablet 1 4/28/2022     Sig: TAKE ONE TABLET BY MOUTH DAILY      lisinopril (PRINIVIL;ZESTRIL) 40 MG tablet 10 tablet 0 8/11/2021 8/21/2021    Sig - Route: Take 1 tablet by mouth daily for 10 days - Oral      hydroCHLOROthiazide (MICROZIDE) 12.5 MG capsule 360 capsule 0 8/11/2021 8/6/2022    Sig - Route:  Take 1 capsule by mouth daily - Oral      Pt has appointment on 8/31 @10:30     [facility] on [address] in chart    Please advise    [phone]

## 2022-08-23 ENCOUNTER — TELEPHONE (OUTPATIENT)
Dept: FAMILY MEDICINE CLINIC | Age: 52
End: 2022-08-23

## 2022-08-23 DIAGNOSIS — I10 ESSENTIAL HYPERTENSION: ICD-10-CM

## 2022-08-23 RX ORDER — LISINOPRIL 40 MG/1
40 TABLET ORAL DAILY
Qty: 10 TABLET | Refills: 0 | Status: SHIPPED | OUTPATIENT
Start: 2022-08-23 | End: 2022-08-31 | Stop reason: SDUPTHER

## 2022-08-23 NOTE — TELEPHONE ENCOUNTER
Pt called stated that when he picked up his medication (Lisinopril) it had only 10 tablets. He was worried that the 10 tablets wouldn't last until his appointment on 8/31 and wanted to know can he get 30 tablets just like the other two medications that he picked up.     Please advise    [phone]

## 2022-08-29 DIAGNOSIS — E78.00 PURE HYPERCHOLESTEROLEMIA: ICD-10-CM

## 2022-08-29 LAB
A/G RATIO: 2.4 (ref 1.1–2.2)
ALBUMIN SERPL-MCNC: 4.6 G/DL (ref 3.4–5)
ALP BLD-CCNC: 64 U/L (ref 40–129)
ALT SERPL-CCNC: 20 U/L (ref 10–40)
ANION GAP SERPL CALCULATED.3IONS-SCNC: 12 MMOL/L (ref 3–16)
AST SERPL-CCNC: 20 U/L (ref 15–37)
BILIRUB SERPL-MCNC: <0.2 MG/DL (ref 0–1)
BUN BLDV-MCNC: 21 MG/DL (ref 7–20)
CALCIUM SERPL-MCNC: 9.6 MG/DL (ref 8.3–10.6)
CHLORIDE BLD-SCNC: 100 MMOL/L (ref 99–110)
CHOLESTEROL, FASTING: 174 MG/DL (ref 0–199)
CO2: 28 MMOL/L (ref 21–32)
CREAT SERPL-MCNC: 1.2 MG/DL (ref 0.9–1.3)
GFR AFRICAN AMERICAN: >60
GFR NON-AFRICAN AMERICAN: >60
GLUCOSE FASTING: 89 MG/DL (ref 70–99)
HDLC SERPL-MCNC: 41 MG/DL (ref 40–60)
LDL CHOLESTEROL CALCULATED: 105 MG/DL
POTASSIUM SERPL-SCNC: 4 MMOL/L (ref 3.5–5.1)
SODIUM BLD-SCNC: 140 MMOL/L (ref 136–145)
TOTAL PROTEIN: 6.5 G/DL (ref 6.4–8.2)
TRIGLYCERIDE, FASTING: 140 MG/DL (ref 0–150)
VLDLC SERPL CALC-MCNC: 28 MG/DL

## 2022-08-31 ENCOUNTER — OFFICE VISIT (OUTPATIENT)
Dept: FAMILY MEDICINE CLINIC | Age: 52
End: 2022-08-31
Payer: COMMERCIAL

## 2022-08-31 VITALS
DIASTOLIC BLOOD PRESSURE: 86 MMHG | SYSTOLIC BLOOD PRESSURE: 136 MMHG | TEMPERATURE: 98 F | HEART RATE: 73 BPM | HEIGHT: 66 IN | BODY MASS INDEX: 31.18 KG/M2 | WEIGHT: 194 LBS

## 2022-08-31 DIAGNOSIS — A60.00 GENITAL HERPES SIMPLEX, UNSPECIFIED SITE: ICD-10-CM

## 2022-08-31 DIAGNOSIS — R79.89 LOW TESTOSTERONE IN MALE: ICD-10-CM

## 2022-08-31 DIAGNOSIS — E29.1 HYPOTESTOSTERONEMIA IN MALE: ICD-10-CM

## 2022-08-31 DIAGNOSIS — Z00.00 PREVENTATIVE HEALTH CARE: Primary | ICD-10-CM

## 2022-08-31 DIAGNOSIS — I10 ESSENTIAL HYPERTENSION: ICD-10-CM

## 2022-08-31 DIAGNOSIS — Z12.11 SCREEN FOR COLON CANCER: ICD-10-CM

## 2022-08-31 PROBLEM — B00.9 HSV (HERPES SIMPLEX VIRUS) INFECTION: Status: ACTIVE | Noted: 2022-08-31

## 2022-08-31 PROCEDURE — 99396 PREV VISIT EST AGE 40-64: CPT | Performed by: NURSE PRACTITIONER

## 2022-08-31 RX ORDER — VALACYCLOVIR HYDROCHLORIDE 500 MG/1
TABLET, FILM COATED ORAL
Qty: 90 TABLET | Refills: 3 | Status: SHIPPED | OUTPATIENT
Start: 2022-08-31

## 2022-08-31 RX ORDER — VALACYCLOVIR HYDROCHLORIDE 500 MG/1
TABLET, FILM COATED ORAL
Qty: 90 TABLET | Refills: 3 | Status: SHIPPED | OUTPATIENT
Start: 2022-08-31 | End: 2022-08-31 | Stop reason: SDUPTHER

## 2022-08-31 RX ORDER — HYDROCHLOROTHIAZIDE 12.5 MG/1
12.5 CAPSULE, GELATIN COATED ORAL DAILY
Qty: 90 CAPSULE | Refills: 3 | Status: SHIPPED | OUTPATIENT
Start: 2022-08-31 | End: 2022-11-01 | Stop reason: SDUPTHER

## 2022-08-31 RX ORDER — LISINOPRIL 40 MG/1
40 TABLET ORAL DAILY
Qty: 90 TABLET | Refills: 3 | Status: SHIPPED | OUTPATIENT
Start: 2022-08-31 | End: 2022-11-01 | Stop reason: SDUPTHER

## 2022-08-31 RX ORDER — TESTOSTERONE CYPIONATE 200 MG/ML
200 INJECTION INTRAMUSCULAR SEE ADMIN INSTRUCTIONS
Qty: 10 ML | Refills: 1 | Status: SHIPPED | OUTPATIENT
Start: 2022-08-31 | End: 2022-12-01

## 2022-08-31 ASSESSMENT — PATIENT HEALTH QUESTIONNAIRE - PHQ9
2. FEELING DOWN, DEPRESSED OR HOPELESS: 0
SUM OF ALL RESPONSES TO PHQ9 QUESTIONS 1 & 2: 0
SUM OF ALL RESPONSES TO PHQ QUESTIONS 1-9: 0
1. LITTLE INTEREST OR PLEASURE IN DOING THINGS: 0
SUM OF ALL RESPONSES TO PHQ QUESTIONS 1-9: 0

## 2022-08-31 NOTE — PROGRESS NOTES
Delia Perales  : 1970  Encounter date: 2022    This janeen 45 y.o. male who presents with  Chief Complaint   Patient presents with    Annual Exam     Discuss labs        History of present illness:    HPI History and Physical      Delia Perales  YOB: 1970    Date of Service:  2022    Chief Complaint:   Delia Perales is a 45 y.o. male who  presents for physical examination. HPI: PT is 45year old male for annual PE. Recent labs showed improved LDL- 105, all other labs NL. Pt with existing hypertension and HSV, well controlled. Due for HM. Pt is requesting to have get larger quantity of testosterone, payng more for every 2 weeks, would like larger vial- 10 mL for cost purposes. Wt Readings from Last 3 Encounters:   22 194 lb (88 kg)   21 182 lb (82.6 kg)   05/10/21 184 lb (83.5 kg)     BP Readings from Last 3 Encounters:   22 136/86   21 128/88   05/10/21 134/78       Patient Active Problem List   Diagnosis    Essential hypertension    Genital herpes simplex    Low testosterone in male       No Known Allergies  Outpatient Medications Marked as Taking for the 22 encounter (Office Visit) with SUDHIR Burt NP   Medication Sig Dispense Refill    lisinopril (PRINIVIL;ZESTRIL) 40 MG tablet Take 1 tablet by mouth daily 90 tablet 3    hydroCHLOROthiazide (MICROZIDE) 12.5 MG capsule Take 1 capsule by mouth daily 90 capsule 3    valACYclovir (VALTREX) 500 MG tablet TAKE ONE TABLET BY MOUTH DAILY 90 tablet 3    testosterone cypionate (DEPOTESTOTERONE CYPIONATE) 200 MG/ML injection Inject 1 mL into the muscle See Admin Instructions for 92 days. Inject 1 ml every 14 days 10 mL 1       History reviewed. No pertinent past medical history. History reviewed. No pertinent surgical history. History reviewed. No pertinent family history.   Social History     Socioeconomic History    Marital status: Single     Spouse name: Not on file    Number of children: Not on file    Years of education: Not on file    Highest education level: Not on file   Occupational History    Not on file   Tobacco Use    Smoking status: Never    Smokeless tobacco: Never   Substance and Sexual Activity    Alcohol use: Not on file    Drug use: Not on file    Sexual activity: Not on file   Other Topics Concern    Not on file   Social History Narrative    Not on file     Social Determinants of Health     Financial Resource Strain: Not on file   Food Insecurity: Not on file   Transportation Needs: Not on file   Physical Activity: Not on file   Stress: Not on file   Social Connections: Not on file   Intimate Partner Violence: Not on file   Housing Stability: Not on file       Self-testicular exams: Yes  Sexual activity: none   Last eye exam: 2022  Exercise: no reqular exercise    Review of Systems:  A comprehensive review of systems was negative except for what was noted in the HPI. Physical Exam:   Vitals:    08/31/22 1044   BP: 136/86   Pulse: 73   Temp: 98 °F (36.7 °C)   Weight: 194 lb (88 kg)   Height: 5' 6\" (1.676 m)     Body mass index is 31.31 kg/m². Constitutional: He is oriented to person, place, and time. He appears well-developed and well-nourished. No distress. HEENT:   Head: Normocephalic and atraumatic. Right Ear: Tympanic membrane, external ear and ear canal normal.   Left Ear: Tympanic membrane, external ear and ear canal normal.   Mouth/Throat: Oropharynx is clear and moist and mucous membranes are normal. No oropharyngeal exudate or posterior oropharyngeal erythema. He has no cervical adenopathy. Eyes: Conjunctivae and extraocular motions are normal. Pupils are equal, round, and reactive to light. Neck:  Supple. No JVD present. Carotid bruit is not present. No mass and no thyromegaly present. Cardiovascular: Normal rate, regular rhythm, normal heart sounds and intact distal pulses. Exam reveals no gallop and no friction rub.  No murmur heard.  Pulmonary/Chest: Effort normal and breath sounds normal. No respiratory distress. He has no wheezes, rhonchi or rales. Abdominal: Soft, non-tender. Bowel sounds and aorta are normal. There is no organomegaly, mass or bruit. Genitourinary:  rectal not indicated by age criteria and lack of symptoms. Musculoskeletal: Normal range of motion, no synovitis. He exhibits no edema. Neurological: He is alert and oriented to person, place, and time. He has normal reflexes. No cranial nerve deficit. Coordination normal.   Skin: Skin is warm, dry and intact. No suspicious lesions are noted. Psychiatric: He has a normal mood and affect.  His speech is normal and behavior is normal. Judgment, cognition and memory are normal.     Preventive Care:  Health Maintenance   Topic Date Due    DTaP/Tdap/Td vaccine (1 - Tdap) Never done    Colorectal Cancer Screen  Never done    Shingles vaccine (1 of 2) Never done    COVID-19 Vaccine (4 - Booster for Pfizer series) 04/17/2022    Flu vaccine (1) 09/01/2022    Depression Screen  12/06/2022    Lipids  08/29/2027    Hepatitis C screen  Completed    HIV screen  Completed    Hepatitis A vaccine  Aged Out    Hepatitis B vaccine  Aged Out    Hib vaccine  Aged Out    Meningococcal (ACWY) vaccine  Aged Out    Pneumococcal 0-64 years Vaccine  Aged [employer] plan of care for Jack Sal        8/31/2022           Preventive Measures Status       Recommendations for screening   Prostate Cancer Screen  Lab Results   Component Value Date    PSA 1.7 10/04/2021      Repeat yearly    Colon Cancer Screen  Last colonoscopy: NA Test recommended and ordered  Test recommended and referral provided   Diabetes Screen  Glucose (mg/dL)   Date Value   04/11/2022 102 (H)    Repeat yearly   Cholesterol Screen  Lab Results   Component Value Date    CHOL 196 04/11/2022    TRIG 82 04/11/2022    HDL 41 08/29/2022    LDLCALC 105 (H) 08/29/2022    Repeat yearly   Aspirin for Cardiovascular Prevention   No Not indicated   Weight: Body mass index is 31.31 kg/m². 5' 6\" (1.676 m)194 lb (88 kg)  Your BMI is 25 or greater, which indicates that you are overweight   Living Will: No recommended    Recommended Immunizations    Immunization History   Administered Date(s) Administered    COVID-19, PFIZER PURPLE top, DILUTE for use, (age 15 y+), 30mcg/0.3mL 03/17/2021, 04/07/2021, 12/17/2021    Influenza vaccine:  recommended every fall  Shingles vaccine:  discussed, due         Other Recommendations   Follow up in this office every 12 months for re-evaluation of your chronic medical issues             Assessment/Plan:  Janae Parr was seen today for annual exam.    Diagnoses and all orders for this visit:    Preventative health care    Essential hypertension  -     lisinopril (PRINIVIL;ZESTRIL) 40 MG tablet; Take 1 tablet by mouth daily  -     hydroCHLOROthiazide (MICROZIDE) 12.5 MG capsule; Take 1 capsule by mouth daily    Screen for colon cancer  -     AFL - Ryan Pham MD, Gastroenterology, [facility]    Genital herpes simplex, unspecified site  -     Discontinue: valACYclovir (VALTREX) 500 MG tablet; TAKE ONE TABLET BY MOUTH DAILY  -     valACYclovir (VALTREX) 500 MG tablet; TAKE ONE TABLET BY MOUTH DAILY    Low testosterone in male    Hypotestosteronemia in male  -     testosterone cypionate (DEPOTESTOTERONE CYPIONATE) 200 MG/ML injection; Inject 1 mL into the muscle See Admin Instructions for 92 days. Inject 1 ml every 14 days        No current outpatient medications on file prior to visit. No current facility-administered medications on file prior to visit. No Known Allergies  History reviewed. No pertinent past medical history. History reviewed. No pertinent surgical history. History reviewed. No pertinent family history.    Social History     Tobacco Use    Smoking status: Never    Smokeless tobacco: Never   Substance Use Topics    Alcohol use: Not on file        Review of Systems    Objective:    /86   Pulse 73   Temp 98 °F (36.7 °C)   Ht 5' 6\" (1.676 m)   Wt 194 lb (88 kg)   BMI 31.31 kg/m²   Weight: 194 lb (88 kg)     BP Readings from Last 3 Encounters:   08/31/22 136/86   08/11/21 128/88   05/10/21 134/78     Wt Readings from Last 3 Encounters:   08/31/22 194 lb (88 kg)   08/11/21 182 lb (82.6 kg)   05/10/21 184 lb (83.5 kg)     BMI Readings from Last 3 Encounters:   08/31/22 31.31 kg/m²   08/11/21 28.94 kg/m²   05/10/21 29.03 kg/m²       Physical Exam    Assessment/Plan    1. Preventative health care  Advised routine dental and vision  Increased exercise, healthy diet and weight loss  Advised living will    2. Essential hypertension  controlled  - lisinopril (PRINIVIL;ZESTRIL) 40 MG tablet; Take 1 tablet by mouth daily  Dispense: 90 tablet; Refill: 3  - hydroCHLOROthiazide (MICROZIDE) 12.5 MG capsule; Take 1 capsule by mouth daily  Dispense: 90 capsule; Refill: 3    3. Screen for colon cancer  - AFL - Mariano Almanzar MD, Gastroenterology, [facility]    4. Genital herpes simplex, unspecified site  Controlled  Advised safe sex practices  - valACYclovir (VALTREX) 500 MG tablet; TAKE ONE TABLET BY MOUTH DAILY  Dispense: 90 tablet; Refill: 3    5. Low testosterone in male  Testosterone 200 mg/mL  Controlled  Called pharmacy, insurance will only cover smaller dose vials, twice monthly    Return in about 1 year (around 8/31/2023) for annual check up, lab review, hypertension re-check. This dictation was generated by voice recognition computer software. Although all attempts are made to edit the dictation for accuracy, there may be errors in the transcription that are not intended.

## 2022-11-01 DIAGNOSIS — I10 ESSENTIAL HYPERTENSION: ICD-10-CM

## 2022-11-01 RX ORDER — HYDROCHLOROTHIAZIDE 12.5 MG/1
12.5 CAPSULE, GELATIN COATED ORAL DAILY
Qty: 90 CAPSULE | Refills: 3 | Status: SHIPPED | OUTPATIENT
Start: 2022-11-01 | End: 2023-10-27

## 2022-11-01 RX ORDER — LISINOPRIL 40 MG/1
40 TABLET ORAL DAILY
Qty: 90 TABLET | Refills: 3 | Status: SHIPPED | OUTPATIENT
Start: 2022-11-01 | End: 2023-01-30

## 2022-11-09 DIAGNOSIS — E29.1 HYPOTESTOSTERONEMIA IN MALE: ICD-10-CM

## 2022-11-10 RX ORDER — TESTOSTERONE CYPIONATE 200 MG/ML
INJECTION INTRAMUSCULAR
Qty: 10 ML | Refills: 0 | Status: SHIPPED | OUTPATIENT
Start: 2022-11-10 | End: 2022-11-24

## 2022-11-10 NOTE — TELEPHONE ENCOUNTER
Medication:   Requested Prescriptions     Pending Prescriptions Disp Refills    testosterone cypionate (DEPOTESTOTERONE CYPIONATE) 200 MG/ML injection [Pharmacy Med Name: TESTOSTERONE CYP 200MG/ML SDV 1ML] 10 mL      Sig: INJECT 1ML EVERY 14 DAYS      Last Filled:  8/31/2022    Patient Phone Number: [phone] (home)     Last appt: 8/31/2022   Next appt: Visit date not found    Last OARRS: No flowsheet data found. PDMP Monitoring:    Last PDMP Luis Alberto Jefferson as Reviewed [facility]):  Review User Review Instant Review Result   Josselin García 8/31/2022 11:29 AM Reviewed PDMP [1]     Preferred Pharmacy:   [address]  [address]  [place] [address]  Phone: [phone] Fax: [phone]    Zoe Bagley, [address] [facility] [phone]  [address] [place] [address]  Phone: [phone] Fax: [phone] [facility], [address] [place], [place] [id number] - f [phone]  [place] [address]  Phone: [phone] Fax: [phone]

## 2023-04-18 DIAGNOSIS — E29.1 HYPOTESTOSTERONEMIA IN MALE: ICD-10-CM

## 2023-04-19 RX ORDER — TESTOSTERONE CYPIONATE 200 MG/ML
INJECTION, SOLUTION INTRAMUSCULAR
Qty: 10 ML | Refills: 0 | Status: SHIPPED | OUTPATIENT
Start: 2023-04-19 | End: 2023-05-03

## 2023-04-19 NOTE — TELEPHONE ENCOUNTER
Medication:   Requested Prescriptions     Pending Prescriptions Disp Refills    testosterone cypionate (DEPOTESTOTERONE CYPIONATE) 200 MG/ML injection [Pharmacy Med Name: TESTOSTERONE CYP 200MG/ML MDV 10ML] 10 mL      Sig: INJECT 1 ML INTO THE MUSCLE EVERY 14 DAYS      Last Filled:  11/10/2022    Patient Phone Number: [phone] (home)     Last appt: 8/31/2022   Next appt: Visit date not found    Last OARRS: No flowsheet data found. PDMP Monitoring:    Last PDMP Yvette Nicely as Reviewed [facility]):  Review User Review Instant Review Result   Greg Almanza 8/31/2022 11:29 AM Reviewed PDMP [1]     Preferred Pharmacy:   [address]  [address]  [address] [place] [address]  Phone: [phone] Fax: [phone]    [place] [facility] [place] [phone]  [facility] [address] [address]  Phone: [phone] Fax: [phone] [facility], [address] [place] [address] [phone] - F [phone]  [facility], [facility] [address]  Phone: [phone] Fax: [phone]

## 2023-09-05 ENCOUNTER — TELEPHONE (OUTPATIENT)
Dept: FAMILY MEDICINE CLINIC | Age: 53
End: 2023-09-05

## 2023-09-05 NOTE — TELEPHONE ENCOUNTER
----- Message from Sandrine Stubbs sent at 9/5/2023  3:43 PM EDT -----  Subject: Referral Request    Reason for referral request? PT NEEDS BLOODWORK ORDERS FOR HIS PHYSICAL ON   9- PLEASE CALL PT WHEN ORDERS ARE IN SO HE CAN GET DRAWN  Provider patient wants to be referred to(if known):     Provider Phone Number(if known):     Additional Information for Provider?   ---------------------------------------------------------------------------  --------------  Bianca Marine Patrick    [phone]; OK to leave message on voicemail  ---------------------------------------------------------------------------  --------------

## 2023-09-06 DIAGNOSIS — Z13.1 SCREENING FOR DIABETES MELLITUS: ICD-10-CM

## 2023-09-06 DIAGNOSIS — I10 ESSENTIAL HYPERTENSION: Primary | ICD-10-CM

## 2023-09-06 DIAGNOSIS — Z12.5 SCREENING FOR PROSTATE CANCER: ICD-10-CM

## 2023-09-06 DIAGNOSIS — Z13.220 SCREENING FOR CHOLESTEROL LEVEL: ICD-10-CM

## 2023-09-06 DIAGNOSIS — Z00.00 PREVENTATIVE HEALTH CARE: ICD-10-CM

## 2023-09-07 DIAGNOSIS — Z13.1 SCREENING FOR DIABETES MELLITUS: ICD-10-CM

## 2023-09-07 DIAGNOSIS — Z12.5 SCREENING FOR PROSTATE CANCER: ICD-10-CM

## 2023-09-07 DIAGNOSIS — I10 ESSENTIAL HYPERTENSION: ICD-10-CM

## 2023-09-07 DIAGNOSIS — Z13.220 SCREENING FOR CHOLESTEROL LEVEL: ICD-10-CM

## 2023-09-07 LAB
ALBUMIN SERPL-MCNC: 4.7 G/DL (ref 3.4–5)
ALBUMIN/GLOB SERPL: 2.4 {RATIO} (ref 1.1–2.2)
ALP SERPL-CCNC: 65 U/L (ref 40–129)
ALT SERPL-CCNC: 64 U/L (ref 10–40)
ANION GAP SERPL CALCULATED.3IONS-SCNC: 13 MMOL/L (ref 3–16)
AST SERPL-CCNC: 34 U/L (ref 15–37)
BASOPHILS # BLD: 0 K/UL (ref 0–0.2)
BASOPHILS NFR BLD: 0.5 %
BILIRUB SERPL-MCNC: 0.3 MG/DL (ref 0–1)
BUN SERPL-MCNC: 28 MG/DL (ref 7–20)
CALCIUM SERPL-MCNC: 10 MG/DL (ref 8.3–10.6)
CHLORIDE SERPL-SCNC: 102 MMOL/L (ref 99–110)
CHOLEST SERPL-MCNC: 202 MG/DL (ref 0–199)
CO2 SERPL-SCNC: 27 MMOL/L (ref 21–32)
CREAT SERPL-MCNC: 1.2 MG/DL (ref 0.9–1.3)
DEPRECATED RDW RBC AUTO: 14.5 % (ref 12.4–15.4)
EOSINOPHIL # BLD: 0.1 K/UL (ref 0–0.6)
EOSINOPHIL NFR BLD: 2.1 %
GFR SERPLBLD CREATININE-BSD FMLA CKD-EPI: >60 ML/MIN/{1.73_M2}
GLUCOSE P FAST SERPL-MCNC: 95 MG/DL (ref 70–99)
HCT VFR BLD AUTO: 49 % (ref 40.5–52.5)
HDLC SERPL-MCNC: 51 MG/DL (ref 40–60)
HGB BLD-MCNC: 16.8 G/DL (ref 13.5–17.5)
LDL CHOLESTEROL CALCULATED: 134 MG/DL
LYMPHOCYTES # BLD: 2 K/UL (ref 1–5.1)
LYMPHOCYTES NFR BLD: 40.4 %
MCH RBC QN AUTO: 30.2 PG (ref 26–34)
MCHC RBC AUTO-ENTMCNC: 34.3 G/DL (ref 31–36)
MCV RBC AUTO: 88 FL (ref 80–100)
MONOCYTES # BLD: 0.6 K/UL (ref 0–1.3)
MONOCYTES NFR BLD: 11.5 %
NEUTROPHILS # BLD: 2.2 K/UL (ref 1.7–7.7)
NEUTROPHILS NFR BLD: 45.5 %
PLATELET # BLD AUTO: 228 K/UL (ref 135–450)
PMV BLD AUTO: 7.8 FL (ref 5–10.5)
POTASSIUM SERPL-SCNC: 4.5 MMOL/L (ref 3.5–5.1)
PROT SERPL-MCNC: 6.7 G/DL (ref 6.4–8.2)
PSA SERPL DL<=0.01 NG/ML-MCNC: 1.81 NG/ML (ref 0–4)
RBC # BLD AUTO: 5.57 M/UL (ref 4.2–5.9)
SODIUM SERPL-SCNC: 142 MMOL/L (ref 136–145)
TRIGL SERPL-MCNC: 84 MG/DL (ref 0–150)
VLDLC SERPL CALC-MCNC: 17 MG/DL
WBC # BLD AUTO: 4.9 K/UL (ref 4–11)

## 2023-09-08 LAB
EST. AVERAGE GLUCOSE BLD GHB EST-MCNC: 119.8 MG/DL
HBA1C MFR BLD: 5.8 %

## 2023-09-20 ENCOUNTER — OFFICE VISIT (OUTPATIENT)
Dept: FAMILY MEDICINE CLINIC | Age: 53
End: 2023-09-20
Payer: COMMERCIAL

## 2023-09-20 VITALS
HEART RATE: 77 BPM | WEIGHT: 192 LBS | DIASTOLIC BLOOD PRESSURE: 84 MMHG | TEMPERATURE: 98.1 F | HEIGHT: 66 IN | BODY MASS INDEX: 30.86 KG/M2 | OXYGEN SATURATION: 94 % | SYSTOLIC BLOOD PRESSURE: 128 MMHG

## 2023-09-20 DIAGNOSIS — A60.00 GENITAL HERPES SIMPLEX, UNSPECIFIED SITE: ICD-10-CM

## 2023-09-20 DIAGNOSIS — Z00.00 PREVENTATIVE HEALTH CARE: Primary | ICD-10-CM

## 2023-09-20 DIAGNOSIS — M77.8 TENDONITIS OF ELBOW, LEFT: ICD-10-CM

## 2023-09-20 DIAGNOSIS — E29.1 HYPOTESTOSTERONEMIA IN MALE: ICD-10-CM

## 2023-09-20 DIAGNOSIS — Z23 NEED FOR VACCINATION: ICD-10-CM

## 2023-09-20 DIAGNOSIS — I10 ESSENTIAL HYPERTENSION: ICD-10-CM

## 2023-09-20 PROCEDURE — 99396 PREV VISIT EST AGE 40-64: CPT | Performed by: NURSE PRACTITIONER

## 2023-09-20 PROCEDURE — 3074F SYST BP LT 130 MM HG: CPT | Performed by: NURSE PRACTITIONER

## 2023-09-20 PROCEDURE — 3079F DIAST BP 80-89 MM HG: CPT | Performed by: NURSE PRACTITIONER

## 2023-09-20 PROCEDURE — 90471 IMMUNIZATION ADMIN: CPT | Performed by: NURSE PRACTITIONER

## 2023-09-20 PROCEDURE — 90674 CCIIV4 VAC NO PRSV 0.5 ML IM: CPT | Performed by: NURSE PRACTITIONER

## 2023-09-20 RX ORDER — HYDROCHLOROTHIAZIDE 12.5 MG/1
12.5 CAPSULE, GELATIN COATED ORAL DAILY
Qty: 90 CAPSULE | Refills: 3 | Status: SHIPPED | OUTPATIENT
Start: 2023-09-20 | End: 2024-09-14

## 2023-09-20 RX ORDER — VALACYCLOVIR HYDROCHLORIDE 500 MG/1
TABLET, FILM COATED ORAL
Qty: 90 TABLET | Refills: 3 | Status: SHIPPED | OUTPATIENT
Start: 2023-09-20

## 2023-09-20 RX ORDER — LISINOPRIL 40 MG/1
40 TABLET ORAL DAILY
Qty: 90 TABLET | Refills: 3 | Status: SHIPPED | OUTPATIENT
Start: 2023-09-20 | End: 2024-09-14

## 2023-09-20 RX ORDER — TESTOSTERONE CYPIONATE 200 MG/ML
INJECTION, SOLUTION INTRAMUSCULAR
Qty: 10 ML | Refills: 0 | Status: SHIPPED | OUTPATIENT
Start: 2023-09-20 | End: 2024-02-20

## 2023-09-20 SDOH — ECONOMIC STABILITY: HOUSING INSECURITY
IN THE LAST 12 MONTHS, WAS THERE A TIME WHEN YOU DID NOT HAVE A STEADY PLACE TO SLEEP OR SLEPT IN A SHELTER (INCLUDING NOW)?: NO

## 2023-09-20 SDOH — ECONOMIC STABILITY: FOOD INSECURITY: WITHIN THE PAST 12 MONTHS, YOU WORRIED THAT YOUR FOOD WOULD RUN OUT BEFORE YOU GOT MONEY TO BUY MORE.: NEVER TRUE

## 2023-09-20 SDOH — ECONOMIC STABILITY: FOOD INSECURITY: WITHIN THE PAST 12 MONTHS, THE FOOD YOU BOUGHT JUST DIDN'T LAST AND YOU DIDN'T HAVE MONEY TO GET MORE.: NEVER TRUE

## 2023-09-20 SDOH — ECONOMIC STABILITY: INCOME INSECURITY: HOW HARD IS IT FOR YOU TO PAY FOR THE VERY BASICS LIKE FOOD, HOUSING, MEDICAL CARE, AND HEATING?: NOT HARD AT ALL

## 2023-09-20 ASSESSMENT — PATIENT HEALTH QUESTIONNAIRE - PHQ9
4. FEELING TIRED OR HAVING LITTLE ENERGY: 0
SUM OF ALL RESPONSES TO PHQ QUESTIONS 1-9: 0
SUM OF ALL RESPONSES TO PHQ QUESTIONS 1-9: 0
10. IF YOU CHECKED OFF ANY PROBLEMS, HOW DIFFICULT HAVE THESE PROBLEMS MADE IT FOR YOU TO DO YOUR WORK, TAKE CARE OF THINGS AT HOME, OR GET ALONG WITH OTHER PEOPLE: 0
1. LITTLE INTEREST OR PLEASURE IN DOING THINGS: 0
7. TROUBLE CONCENTRATING ON THINGS, SUCH AS READING THE NEWSPAPER OR WATCHING TELEVISION: 0
6. FEELING BAD ABOUT YOURSELF - OR THAT YOU ARE A FAILURE OR HAVE LET YOURSELF OR YOUR FAMILY DOWN: 0
8. MOVING OR SPEAKING SO SLOWLY THAT OTHER PEOPLE COULD HAVE NOTICED. OR THE OPPOSITE, BEING SO FIGETY OR RESTLESS THAT YOU HAVE BEEN MOVING AROUND A LOT MORE THAN USUAL: 0
5. POOR APPETITE OR OVEREATING: 0
SUM OF ALL RESPONSES TO PHQ9 QUESTIONS 1 & 2: 0
SUM OF ALL RESPONSES TO PHQ QUESTIONS 1-9: 0
3. TROUBLE FALLING OR STAYING ASLEEP: 0
9. THOUGHTS THAT YOU WOULD BE BETTER OFF DEAD, OR OF HURTING YOURSELF: 0
2. FEELING DOWN, DEPRESSED OR HOPELESS: 0
SUM OF ALL RESPONSES TO PHQ QUESTIONS 1-9: 0

## 2023-09-20 NOTE — PROGRESS NOTES
Sana Miller  : 1970  Encounter date: 2023    This janeen 47 y.o. male who presents with  Chief Complaint   Patient presents with    Annual Exam     Lt tennis elbow, wants cortisone injection    Discuss Labs       History of present illness:    HPI History and Physical      Sana Miller  YOB: 1970    Date of Service:  2023    Chief Complaint:   Sana Miller is a 47 y.o. male who  presents for physical examination. HPI: Pt is 47year old male for annual exam.  Pt with existing hypotestosterone, genital herpes and hypertension. Recent labs showed elevated LDL- 134. Pt with complaints of Left elbow pain, has received cortisone injections in shoulders and elbow thru [address] [place] [address], requesting injection, advised follow up with orthopedics or in office physician. Pt is up to date on . Wt Readings from Last 3 Encounters:   23 192 lb (87.1 kg)   22 194 lb (88 kg)   21 182 lb (82.6 kg)     BP Readings from Last 3 Encounters:   23 128/84   22 136/86   21 128/88       Patient Active Problem List   Diagnosis    Essential hypertension    Genital herpes simplex    Low testosterone in male       No Known Allergies  Outpatient Medications Marked as Taking for the 23 encounter (Office Visit) with SUDHIR Quinteros NP   Medication Sig Dispense Refill    testosterone cypionate (DEPOTESTOTERONE CYPIONATE) 200 MG/ML injection INJECT 1 ML INTO THE MUSCLE EVERY 14 DAYS 10 mL 0    lisinopril (PRINIVIL;ZESTRIL) 40 MG tablet Take 1 tablet by mouth daily 90 tablet 3    hydroCHLOROthiazide (MICROZIDE) 12.5 MG capsule Take 1 capsule by mouth daily 90 capsule 3    valACYclovir (VALTREX) 500 MG tablet TAKE ONE TABLET BY MOUTH DAILY 90 tablet 3       History reviewed. No pertinent past medical history. History reviewed. No pertinent surgical history. History reviewed. No pertinent family history.   Social History     Socioeconomic History

## 2023-09-21 ENCOUNTER — OFFICE VISIT (OUTPATIENT)
Dept: FAMILY MEDICINE CLINIC | Age: 53
End: 2023-09-21
Payer: COMMERCIAL

## 2023-09-21 VITALS
DIASTOLIC BLOOD PRESSURE: 82 MMHG | RESPIRATION RATE: 12 BRPM | WEIGHT: 191.5 LBS | BODY MASS INDEX: 30.68 KG/M2 | TEMPERATURE: 97.3 F | SYSTOLIC BLOOD PRESSURE: 112 MMHG

## 2023-09-21 DIAGNOSIS — A60.00 GENITAL HERPES SIMPLEX, UNSPECIFIED SITE: ICD-10-CM

## 2023-09-21 DIAGNOSIS — M77.12 LATERAL EPICONDYLITIS OF LEFT ELBOW: Primary | ICD-10-CM

## 2023-09-21 PROCEDURE — 3079F DIAST BP 80-89 MM HG: CPT | Performed by: FAMILY MEDICINE

## 2023-09-21 PROCEDURE — 3074F SYST BP LT 130 MM HG: CPT | Performed by: FAMILY MEDICINE

## 2023-09-21 PROCEDURE — 99213 OFFICE O/P EST LOW 20 MIN: CPT | Performed by: FAMILY MEDICINE

## 2023-09-21 PROCEDURE — 20551 NJX 1 TENDON ORIGIN/INSJ: CPT | Performed by: FAMILY MEDICINE

## 2023-09-21 RX ORDER — TRIAMCINOLONE ACETONIDE 40 MG/ML
40 INJECTION, SUSPENSION INTRA-ARTICULAR; INTRAMUSCULAR ONCE
Status: COMPLETED | OUTPATIENT
Start: 2023-09-21 | End: 2023-09-21

## 2023-09-21 RX ADMIN — TRIAMCINOLONE ACETONIDE 40 MG: 40 INJECTION, SUSPENSION INTRA-ARTICULAR; INTRAMUSCULAR at 12:17

## 2023-09-21 NOTE — PROGRESS NOTES
Medication given during visit:    Administrations This Visit       triamcinolone acetonide (KENALOG-40) injection 40 mg       Admin Date  09/21/2023  12:17 Action  Given Dose  40 mg Route  Intra-artICUlar Site  Elbow Left Administered By  Larry Ryan    Ordering Provider: Fan Tapia MD    NDC: 8181-4161-76    Lot#: [id number]    : [employer] (PRIMARY CARE)    Patient Supplied?: No                    Patient instructed to remain in clinic for 20 minutes after injection and was advised to report any adverse reaction to me immediately.

## 2023-09-22 RX ORDER — VALACYCLOVIR HYDROCHLORIDE 500 MG/1
TABLET, FILM COATED ORAL
Qty: 90 TABLET | Refills: 3 | OUTPATIENT
Start: 2023-09-22

## 2023-09-22 NOTE — TELEPHONE ENCOUNTER
Medication:   Requested Prescriptions     Pending Prescriptions Disp Refills    valACYclovir (VALTREX) 500 MG tablet [Pharmacy Med Name: VALACYCLOVIR 500MG TABLETS] 90 tablet 3     Sig: TAKE 1 TABLET BY MOUTH DAILY      Last Filled:      Patient Phone Number: [phone] (home)     Last appt: 9/21/2023   Next appt: Visit date not found    Last OARRS:        No data to display              PDMP Monitoring:    Last PDMP Lee Ann Martinez as Reviewed [facility]):  Review User Review Instant Review Result   Emily Alexander 9/20/2023  8:12 AM Reviewed PDMP [1]     Preferred Pharmacy:   [address]  [place] [address]  Phone: [phone] Fax: [phone]

## 2023-10-16 DIAGNOSIS — I10 ESSENTIAL HYPERTENSION: ICD-10-CM

## 2023-10-16 RX ORDER — LISINOPRIL 40 MG/1
40 TABLET ORAL DAILY
Qty: 90 TABLET | Refills: 3 | Status: SHIPPED | OUTPATIENT
Start: 2023-10-16

## 2023-11-02 DIAGNOSIS — I10 ESSENTIAL HYPERTENSION: ICD-10-CM

## 2023-11-03 RX ORDER — HYDROCHLOROTHIAZIDE 12.5 MG/1
12.5 CAPSULE, GELATIN COATED ORAL DAILY
Qty: 90 CAPSULE | Refills: 3 | Status: SHIPPED | OUTPATIENT
Start: 2023-11-03

## 2024-02-26 DIAGNOSIS — E29.1 HYPOTESTOSTERONEMIA IN MALE: ICD-10-CM

## 2024-02-26 NOTE — TELEPHONE ENCOUNTER
Medication:   Requested Prescriptions     Pending Prescriptions Disp Refills    testosterone cypionate (DEPOTESTOTERONE CYPIONATE) 200 MG/ML injection [Pharmacy Med Name: TESTOSTERONE CYP 200MG/ML MDV 10ML] 10 mL      Sig: INJECT 1 ML INTO THE MUSCLE EVERY 14 DAYS      Last Filled:  9/20/23    Patient Phone Number: [phone] (home)     Last appt: 9/21/2023   Next appt: Visit date not found    Last OARRS:        No data to display              PDMP Monitoring:    Last PDMP Javi as Reviewed ([place]):  Review User Review Instant Review Result   JARED DUVALL 9/20/2023  8:12 AM Reviewed PDMP [1]     Preferred Pharmacy:   [facility] #[id number] - [place], [place] - [address] - P [phone] - F [phone]  [address]  [facility] [address]  Phone: [phone] Fax: [phone]    [facility] - [place], [place] - [address] [facility] - P [phone] - F [phone]  [address] [facility] [address]  Phone: [phone] Fax: [phone]    [facility] - [place], [place] - [address] [place] [address] - P [phone] - F [phone]  [address] [facility] [address]  Phone: [phone] Fax: [phone]

## 2024-02-27 RX ORDER — TESTOSTERONE CYPIONATE 200 MG/ML
INJECTION, SOLUTION INTRAMUSCULAR
Qty: 10 ML | Refills: 0 | Status: SHIPPED | OUTPATIENT
Start: 2024-02-27 | End: 2024-03-25

## 2024-04-01 DIAGNOSIS — E29.1 HYPOTESTOSTERONEMIA IN MALE: ICD-10-CM

## 2024-04-01 LAB
BASOPHILS # BLD: 0 K/UL (ref 0–0.2)
BASOPHILS NFR BLD: 0.5 %
DEPRECATED RDW RBC AUTO: 14.4 % (ref 12.4–15.4)
EOSINOPHIL # BLD: 0.1 K/UL (ref 0–0.6)
EOSINOPHIL NFR BLD: 1.7 %
HCT VFR BLD AUTO: 49.6 % (ref 40.5–52.5)
HGB BLD-MCNC: 16.7 G/DL (ref 13.5–17.5)
LYMPHOCYTES # BLD: 1.7 K/UL (ref 1–5.1)
LYMPHOCYTES NFR BLD: 26 %
MCH RBC QN AUTO: 29.5 PG (ref 26–34)
MCHC RBC AUTO-ENTMCNC: 33.8 G/DL (ref 31–36)
MCV RBC AUTO: 87.5 FL (ref 80–100)
MONOCYTES # BLD: 0.8 K/UL (ref 0–1.3)
MONOCYTES NFR BLD: 12.2 %
NEUTROPHILS # BLD: 3.9 K/UL (ref 1.7–7.7)
NEUTROPHILS NFR BLD: 59.6 %
PLATELET # BLD AUTO: 232 K/UL (ref 135–450)
PMV BLD AUTO: 7.6 FL (ref 5–10.5)
RBC # BLD AUTO: 5.67 M/UL (ref 4.2–5.9)
WBC # BLD AUTO: 6.5 K/UL (ref 4–11)

## 2024-07-25 DIAGNOSIS — E29.1 HYPOTESTOSTERONEMIA IN MALE: ICD-10-CM

## 2024-07-25 RX ORDER — TESTOSTERONE CYPIONATE 200 MG/ML
INJECTION, SOLUTION INTRAMUSCULAR
Qty: 10 ML | Refills: 0 | Status: SHIPPED | OUTPATIENT
Start: 2024-07-25 | End: 2024-08-25

## 2024-09-17 DIAGNOSIS — A60.00 GENITAL HERPES SIMPLEX, UNSPECIFIED SITE: ICD-10-CM

## 2024-09-17 RX ORDER — VALACYCLOVIR HYDROCHLORIDE 500 MG/1
TABLET, FILM COATED ORAL
Qty: 90 TABLET | Refills: 3 | OUTPATIENT
Start: 2024-09-17

## 2024-09-19 DIAGNOSIS — A60.00 GENITAL HERPES SIMPLEX, UNSPECIFIED SITE: ICD-10-CM

## 2024-09-19 RX ORDER — VALACYCLOVIR HYDROCHLORIDE 500 MG/1
TABLET, FILM COATED ORAL
Qty: 30 TABLET | Refills: 0 | Status: SHIPPED | OUTPATIENT
Start: 2024-09-19

## 2024-09-24 ENCOUNTER — OFFICE VISIT (OUTPATIENT)
Dept: FAMILY MEDICINE CLINIC | Age: 54
End: 2024-09-24
Payer: COMMERCIAL

## 2024-09-24 VITALS
BODY MASS INDEX: 30.7 KG/M2 | DIASTOLIC BLOOD PRESSURE: 64 MMHG | HEART RATE: 86 BPM | WEIGHT: 191 LBS | HEIGHT: 66 IN | SYSTOLIC BLOOD PRESSURE: 126 MMHG | TEMPERATURE: 98.9 F | OXYGEN SATURATION: 95 %

## 2024-09-24 DIAGNOSIS — A60.00 GENITAL HERPES SIMPLEX, UNSPECIFIED SITE: ICD-10-CM

## 2024-09-24 DIAGNOSIS — Z12.5 SCREENING FOR MALIGNANT NEOPLASM OF PROSTATE: ICD-10-CM

## 2024-09-24 DIAGNOSIS — R73.9 HYPERGLYCEMIA: ICD-10-CM

## 2024-09-24 DIAGNOSIS — E29.1 HYPOTESTOSTERONEMIA IN MALE: ICD-10-CM

## 2024-09-24 DIAGNOSIS — Z00.00 PREVENTATIVE HEALTH CARE: Primary | ICD-10-CM

## 2024-09-24 DIAGNOSIS — E78.00 PURE HYPERCHOLESTEROLEMIA: ICD-10-CM

## 2024-09-24 DIAGNOSIS — I10 ESSENTIAL HYPERTENSION: ICD-10-CM

## 2024-09-24 PROCEDURE — 3078F DIAST BP <80 MM HG: CPT | Performed by: NURSE PRACTITIONER

## 2024-09-24 PROCEDURE — 3074F SYST BP LT 130 MM HG: CPT | Performed by: NURSE PRACTITIONER

## 2024-09-24 PROCEDURE — 99396 PREV VISIT EST AGE 40-64: CPT | Performed by: NURSE PRACTITIONER

## 2024-09-24 RX ORDER — HYDROCHLOROTHIAZIDE 12.5 MG/1
12.5 CAPSULE ORAL DAILY
Qty: 90 CAPSULE | Refills: 3 | Status: SHIPPED | OUTPATIENT
Start: 2024-09-24

## 2024-09-24 RX ORDER — VALACYCLOVIR HYDROCHLORIDE 500 MG/1
TABLET, FILM COATED ORAL
Qty: 90 TABLET | Refills: 3 | Status: SHIPPED | OUTPATIENT
Start: 2024-09-24

## 2024-09-24 RX ORDER — TESTOSTERONE CYPIONATE 200 MG/ML
INJECTION, SOLUTION INTRAMUSCULAR
Qty: 10 ML | Refills: 0 | Status: SHIPPED | OUTPATIENT
Start: 2024-09-24 | End: 2024-10-25

## 2024-09-24 RX ORDER — LISINOPRIL 40 MG/1
40 TABLET ORAL DAILY
Qty: 90 TABLET | Refills: 3 | Status: SHIPPED | OUTPATIENT
Start: 2024-09-24

## 2024-09-24 SDOH — ECONOMIC STABILITY: INCOME INSECURITY: HOW HARD IS IT FOR YOU TO PAY FOR THE VERY BASICS LIKE FOOD, HOUSING, MEDICAL CARE, AND HEATING?: NOT HARD AT ALL

## 2024-09-24 SDOH — ECONOMIC STABILITY: FOOD INSECURITY: WITHIN THE PAST 12 MONTHS, THE FOOD YOU BOUGHT JUST DIDN'T LAST AND YOU DIDN'T HAVE MONEY TO GET MORE.: NEVER TRUE

## 2024-09-24 SDOH — ECONOMIC STABILITY: FOOD INSECURITY: WITHIN THE PAST 12 MONTHS, YOU WORRIED THAT YOUR FOOD WOULD RUN OUT BEFORE YOU GOT MONEY TO BUY MORE.: NEVER TRUE

## 2024-09-24 ASSESSMENT — PATIENT HEALTH QUESTIONNAIRE - PHQ9
SUM OF ALL RESPONSES TO PHQ9 QUESTIONS 1 & 2: 0
1. LITTLE INTEREST OR PLEASURE IN DOING THINGS: NOT AT ALL
SUM OF ALL RESPONSES TO PHQ QUESTIONS 1-9: 0
2. FEELING DOWN, DEPRESSED OR HOPELESS: NOT AT ALL
SUM OF ALL RESPONSES TO PHQ QUESTIONS 1-9: 0

## 2024-11-01 DIAGNOSIS — I10 ESSENTIAL HYPERTENSION: ICD-10-CM

## 2024-11-01 DIAGNOSIS — A60.00 GENITAL HERPES SIMPLEX, UNSPECIFIED SITE: ICD-10-CM

## 2024-11-01 RX ORDER — HYDROCHLOROTHIAZIDE 12.5 MG/1
12.5 CAPSULE ORAL DAILY
Qty: 90 CAPSULE | Refills: 3 | Status: SHIPPED | OUTPATIENT
Start: 2024-11-01

## 2024-11-01 RX ORDER — LISINOPRIL 40 MG/1
40 TABLET ORAL DAILY
Qty: 90 TABLET | Refills: 3 | Status: SHIPPED | OUTPATIENT
Start: 2024-11-01

## 2024-11-01 NOTE — TELEPHONE ENCOUNTER
Completley out   valACYclovir (VALTREX) 500 MG tablet         [facility] #[id number] - [place] [id number] - P [phone] - F [phone]

## 2024-11-01 NOTE — TELEPHONE ENCOUNTER
lisinopril (PRINIVIL;ZESTRIL) 40 MG tablet       hydroCHLOROthiazide 12.5 MG capsule       [facility] - [place], [place] [address] - P [phone] - F [phone]

## 2024-12-11 DIAGNOSIS — E78.00 PURE HYPERCHOLESTEROLEMIA: ICD-10-CM

## 2024-12-11 DIAGNOSIS — I10 ESSENTIAL HYPERTENSION: ICD-10-CM

## 2024-12-11 DIAGNOSIS — R73.9 HYPERGLYCEMIA: ICD-10-CM

## 2024-12-11 DIAGNOSIS — E29.1 HYPOTESTOSTERONEMIA IN MALE: ICD-10-CM

## 2024-12-11 DIAGNOSIS — Z12.5 SCREENING FOR MALIGNANT NEOPLASM OF PROSTATE: ICD-10-CM

## 2024-12-11 LAB
ALBUMIN SERPL-MCNC: 4.4 G/DL (ref 3.4–5)
ALBUMIN/GLOB SERPL: 2.3 {RATIO} (ref 1.1–2.2)
ALP SERPL-CCNC: 67 U/L (ref 40–129)
ALT SERPL-CCNC: 33 U/L (ref 10–40)
ANION GAP SERPL CALCULATED.3IONS-SCNC: 12 MMOL/L (ref 3–16)
AST SERPL-CCNC: 25 U/L (ref 15–37)
BASOPHILS # BLD: 0 K/UL (ref 0–0.2)
BASOPHILS NFR BLD: 0.6 %
BILIRUB SERPL-MCNC: 0.5 MG/DL (ref 0–1)
BUN SERPL-MCNC: 20 MG/DL (ref 7–20)
CALCIUM SERPL-MCNC: 9.7 MG/DL (ref 8.3–10.6)
CHLORIDE SERPL-SCNC: 100 MMOL/L (ref 99–110)
CHOLEST SERPL-MCNC: 183 MG/DL (ref 0–199)
CO2 SERPL-SCNC: 27 MMOL/L (ref 21–32)
CREAT SERPL-MCNC: 1.2 MG/DL (ref 0.9–1.3)
CREAT UR-MCNC: 173 MG/DL (ref 39–259)
DEPRECATED RDW RBC AUTO: 15.6 % (ref 12.4–15.4)
EOSINOPHIL # BLD: 0.1 K/UL (ref 0–0.6)
EOSINOPHIL NFR BLD: 2.4 %
EST. AVERAGE GLUCOSE BLD GHB EST-MCNC: 111.2 MG/DL
GFR SERPLBLD CREATININE-BSD FMLA CKD-EPI: 71 ML/MIN/{1.73_M2}
GLUCOSE P FAST SERPL-MCNC: 87 MG/DL (ref 70–99)
HBA1C MFR BLD: 5.5 %
HCT VFR BLD AUTO: 53.1 % (ref 40.5–52.5)
HDLC SERPL-MCNC: 47 MG/DL (ref 40–60)
HGB BLD-MCNC: 17.2 G/DL (ref 13.5–17.5)
LDL CHOLESTEROL: 124 MG/DL
LYMPHOCYTES # BLD: 2.1 K/UL (ref 1–5.1)
LYMPHOCYTES NFR BLD: 37 %
MCH RBC QN AUTO: 29 PG (ref 26–34)
MCHC RBC AUTO-ENTMCNC: 32.3 G/DL (ref 31–36)
MCV RBC AUTO: 89.7 FL (ref 80–100)
MICROALBUMIN UR DL<=1MG/L-MCNC: <1.2 MG/DL
MICROALBUMIN/CREAT UR: NORMAL MG/G (ref 0–30)
MONOCYTES # BLD: 0.6 K/UL (ref 0–1.3)
MONOCYTES NFR BLD: 10.8 %
NEUTROPHILS # BLD: 2.8 K/UL (ref 1.7–7.7)
NEUTROPHILS NFR BLD: 49.2 %
PLATELET # BLD AUTO: 245 K/UL (ref 135–450)
PMV BLD AUTO: 7.8 FL (ref 5–10.5)
POTASSIUM SERPL-SCNC: 4.4 MMOL/L (ref 3.5–5.1)
PROT SERPL-MCNC: 6.3 G/DL (ref 6.4–8.2)
PSA SERPL DL<=0.01 NG/ML-MCNC: 1.69 NG/ML (ref 0–4)
RBC # BLD AUTO: 5.92 M/UL (ref 4.2–5.9)
SODIUM SERPL-SCNC: 139 MMOL/L (ref 136–145)
TRIGL SERPL-MCNC: 59 MG/DL (ref 0–150)
VLDLC SERPL CALC-MCNC: 12 MG/DL
WBC # BLD AUTO: 5.8 K/UL (ref 4–11)

## 2024-12-13 LAB — TESTOST SERPL-MCNC: 296 NG/DL (ref 193–740)

## 2025-01-29 ENCOUNTER — TELEPHONE (OUTPATIENT)
Dept: FAMILY MEDICINE CLINIC | Age: 55
End: 2025-01-29

## 2025-01-29 DIAGNOSIS — I10 ESSENTIAL HYPERTENSION: ICD-10-CM

## 2025-01-29 RX ORDER — LISINOPRIL 40 MG/1
40 TABLET ORAL DAILY
Qty: 90 TABLET | Refills: 3 | Status: SHIPPED | OUTPATIENT
Start: 2025-01-29

## 2025-01-29 RX ORDER — HYDROCHLOROTHIAZIDE 12.5 MG/1
12.5 CAPSULE ORAL DAILY
Qty: 90 CAPSULE | Refills: 3 | Status: SHIPPED | OUTPATIENT
Start: 2025-01-29

## 2025-01-29 NOTE — TELEPHONE ENCOUNTER
lisinopril (PRINIVIL;ZESTRIL) 40 MG tablet       hydroCHLOROthiazide 12.5 MG capsule         [facility], [address] [phone] - F [phone]

## 2025-05-13 DIAGNOSIS — E29.1 HYPOTESTOSTERONEMIA IN MALE: ICD-10-CM

## 2025-05-14 RX ORDER — TESTOSTERONE CYPIONATE 200 MG/ML
INJECTION, SOLUTION INTRAMUSCULAR
Qty: 10 ML | Refills: 0 | Status: SHIPPED | OUTPATIENT
Start: 2025-05-14 | End: 2025-06-14

## 2025-05-14 NOTE — TELEPHONE ENCOUNTER
Medication:   Requested Prescriptions     Pending Prescriptions Disp Refills    testosterone cypionate (DEPOTESTOTERONE CYPIONATE) 200 MG/ML injection [Pharmacy Med Name: TESTOSTERONE CYP 200MG/ML MDV 10ML] 10 mL      Sig: ADMINISTER 1 ML IN THE MUSCLE EVERY 14 DAYS      Last Filled:  09/24/24    Patient Phone Number: [phone] (home)     Last appt: 9/24/2024   Next appt: Visit date not found    Last OARRS:        No data to display              PDMP Monitoring:    Last PDMP Javi as Reviewed ([place]):  Review User Review Instant Review Result   JARED DUVALL 9/20/2023  8:12 AM Reviewed PDMP [1]     Preferred Pharmacy:   [facility] #[id number] [place], [place] - [address] - P [phone] - F [phone] [address] [facility] [address]  Phone: [phone] Fax: [phone]

## 2025-06-30 DIAGNOSIS — I10 ESSENTIAL HYPERTENSION: ICD-10-CM

## 2025-06-30 DIAGNOSIS — A60.00 GENITAL HERPES SIMPLEX, UNSPECIFIED SITE: ICD-10-CM

## 2025-06-30 NOTE — TELEPHONE ENCOUNTER
Medication:   Requested Prescriptions     Pending Prescriptions Disp Refills    valACYclovir (VALTREX) 500 MG tablet 90 tablet 3     Sig: TAKE ONE TABLET BY MOUTH DAILY    hydroCHLOROthiazide 12.5 MG capsule 90 capsule 3     Sig: Take 1 capsule by mouth daily    lisinopril (PRINIVIL;ZESTRIL) 40 MG tablet 90 tablet 3     Sig: Take 1 tablet by mouth daily      Last Filled:      Patient Phone Number: [phone] (home)     Last appt: 9/24/2024   Next appt: Visit date not found    Last OARRS:        No data to display              PDMP Monitoring:    Last PDMP Javi as Reviewed ([place]):  Review User Review Instant Review Result   JARED DUVALL 9/20/2023  8:12 AM Reviewed PDMP [1]     Preferred Pharmacy:     [facility], [facility]. [address] [phone] - F [phone] [[id number]]

## 2025-06-30 NOTE — TELEPHONE ENCOUNTER
Patient called in requesting refills on   valACYclovir (VALTREX) 500 MG tablet   hydroCHLOROthiazide 12.5 MG capsule   lisinopril (PRINIVIL;ZESTRIL) 40 MG tablet to be sent to  [facility] - [place], [place] - [address] [place] [address]

## 2025-07-01 RX ORDER — HYDROCHLOROTHIAZIDE 12.5 MG/1
12.5 CAPSULE ORAL DAILY
Qty: 90 CAPSULE | Refills: 3 | Status: SHIPPED | OUTPATIENT
Start: 2025-07-01

## 2025-07-01 RX ORDER — VALACYCLOVIR HYDROCHLORIDE 500 MG/1
TABLET, FILM COATED ORAL
Qty: 90 TABLET | Refills: 3 | Status: SHIPPED | OUTPATIENT
Start: 2025-07-01

## 2025-07-01 RX ORDER — LISINOPRIL 40 MG/1
40 TABLET ORAL DAILY
Qty: 90 TABLET | Refills: 3 | Status: SHIPPED | OUTPATIENT
Start: 2025-07-01